# Patient Record
Sex: MALE | Race: OTHER | HISPANIC OR LATINO | URBAN - METROPOLITAN AREA
[De-identification: names, ages, dates, MRNs, and addresses within clinical notes are randomized per-mention and may not be internally consistent; named-entity substitution may affect disease eponyms.]

---

## 2018-12-08 VITALS
WEIGHT: 179.9 LBS | DIASTOLIC BLOOD PRESSURE: 111 MMHG | TEMPERATURE: 98 F | HEART RATE: 99 BPM | SYSTOLIC BLOOD PRESSURE: 148 MMHG | OXYGEN SATURATION: 95 % | RESPIRATION RATE: 18 BRPM

## 2018-12-08 NOTE — ED ADULT NURSE NOTE - CHIEF COMPLAINT QUOTE
pt presents with cough and chest pain. released from [facility] earlier today for treatment for pneumonia. patient has history of asthma and HIV.

## 2018-12-08 NOTE — ED ADULT TRIAGE NOTE - CHIEF COMPLAINT QUOTE
pt presents with cough and chest pain. released from [facility] earlier today for treatment for pneumonia. patient has history of asthma and HIV.

## 2018-12-08 NOTE — ED ADULT NURSE NOTE - NSIMPLEMENTINTERV_GEN_ALL_ED
Implemented All Universal Safety Interventions:  [place] to call system. Call bell, personal items and telephone within reach. Instruct patient to call for assistance. Room bathroom lighting operational. Non-slip footwear when patient is off stretcher. Physically safe environment: no spills, clutter or unnecessary equipment. Stretcher in lowest position, wheels locked, appropriate side rails in place.

## 2018-12-08 NOTE — ED ADULT NURSE NOTE - OBJECTIVE STATEMENT
41 yo M c.o chest tightness x 1 month. Pt states he was diagnosed with pnuemonia 3 months ago an hospitalized for 1 month for treatment. Pt states for 1 month he has tightness in chest and pain when coughing. Pt c.o white sputum with cough at this time. Pt aslo c.o increasing weakness. Pt noted to be afebrile in triage. Pt exhibiting no signs of acute distress and breathing without accessory muscle use at this time. Pt + pulses noted. Pt denies fever, chills, n,v,d, n/t to upper and lower extremities at this tiem.

## 2018-12-09 ENCOUNTER — INPATIENT (INPATIENT)
Facility: HOSPITAL | Age: 43
LOS: 4 days | Discharge: HOME | DRG: 969 | End: 2018-12-14
Attending: STUDENT IN AN ORGANIZED HEALTH CARE EDUCATION/TRAINING PROGRAM | Admitting: STUDENT IN AN ORGANIZED HEALTH CARE EDUCATION/TRAINING PROGRAM
Payer: MEDICAID

## 2018-12-09 DIAGNOSIS — Z29.9 ENCOUNTER FOR PROPHYLACTIC MEASURES, UNSPECIFIED: ICD-10-CM

## 2018-12-09 DIAGNOSIS — R63.8 OTHER SYMPTOMS AND SIGNS CONCERNING FOOD AND FLUID INTAKE: ICD-10-CM

## 2018-12-09 DIAGNOSIS — J18.9 PNEUMONIA, UNSPECIFIED ORGANISM: ICD-10-CM

## 2018-12-09 DIAGNOSIS — Z91.89 OTHER SPECIFIED PERSONAL RISK FACTORS, NOT ELSEWHERE CLASSIFIED: ICD-10-CM

## 2018-12-09 DIAGNOSIS — B20 HUMAN IMMUNODEFICIENCY VIRUS [HIV] DISEASE: ICD-10-CM

## 2018-12-09 DIAGNOSIS — J45.909 UNSPECIFIED ASTHMA, UNCOMPLICATED: ICD-10-CM

## 2018-12-09 DIAGNOSIS — I10 ESSENTIAL (PRIMARY) HYPERTENSION: ICD-10-CM

## 2018-12-09 LAB
ALBUMIN SERPL ELPH-MCNC: 3.8 G/DL — SIGNIFICANT CHANGE UP (ref 3.4–5)
ALBUMIN SERPL ELPH-MCNC: 4.2 G/DL — SIGNIFICANT CHANGE UP (ref 3.3–5)
ALP SERPL-CCNC: 67 U/L — SIGNIFICANT CHANGE UP (ref 40–120)
ALP SERPL-CCNC: 68 U/L — SIGNIFICANT CHANGE UP (ref 40–120)
ALT FLD-CCNC: 39 U/L — SIGNIFICANT CHANGE UP (ref 10–45)
ALT FLD-CCNC: 50 U/L — HIGH (ref 12–42)
ANION GAP SERPL CALC-SCNC: 15 MMOL/L — SIGNIFICANT CHANGE UP (ref 5–17)
ANION GAP SERPL CALC-SCNC: 8 MMOL/L — LOW (ref 9–16)
AST SERPL-CCNC: 32 U/L — SIGNIFICANT CHANGE UP (ref 15–37)
AST SERPL-CCNC: 33 U/L — SIGNIFICANT CHANGE UP (ref 10–40)
BASE EXCESS BLDA CALC-SCNC: -2.4 MMOL/L — LOW (ref -2–3)
BASOPHILS NFR BLD AUTO: 0.5 % — SIGNIFICANT CHANGE UP (ref 0–2)
BASOPHILS NFR BLD AUTO: 0.7 % — SIGNIFICANT CHANGE UP (ref 0–2)
BILIRUB SERPL-MCNC: 0.8 MG/DL — SIGNIFICANT CHANGE UP (ref 0.2–1.2)
BILIRUB SERPL-MCNC: 0.8 MG/DL — SIGNIFICANT CHANGE UP (ref 0.2–1.2)
BUN SERPL-MCNC: 13 MG/DL — SIGNIFICANT CHANGE UP (ref 7–23)
BUN SERPL-MCNC: 8 MG/DL — SIGNIFICANT CHANGE UP (ref 7–23)
CALCIUM SERPL-MCNC: 9.2 MG/DL — SIGNIFICANT CHANGE UP (ref 8.4–10.5)
CALCIUM SERPL-MCNC: 9.3 MG/DL — SIGNIFICANT CHANGE UP (ref 8.5–10.5)
CHLORIDE SERPL-SCNC: 95 MMOL/L — LOW (ref 96–108)
CHLORIDE SERPL-SCNC: 98 MMOL/L — SIGNIFICANT CHANGE UP (ref 96–108)
CHOLEST SERPL-MCNC: 141 MG/DL — SIGNIFICANT CHANGE UP (ref 10–199)
CO2 SERPL-SCNC: 21 MMOL/L — LOW (ref 22–31)
CO2 SERPL-SCNC: 27 MMOL/L — SIGNIFICANT CHANGE UP (ref 22–31)
CREAT SERPL-MCNC: 0.93 MG/DL — SIGNIFICANT CHANGE UP (ref 0.5–1.3)
CREAT SERPL-MCNC: 1.12 MG/DL — SIGNIFICANT CHANGE UP (ref 0.5–1.3)
EOSINOPHIL NFR BLD AUTO: 1.2 % — SIGNIFICANT CHANGE UP (ref 0–6)
EOSINOPHIL NFR BLD AUTO: 5.3 % — SIGNIFICANT CHANGE UP (ref 0–6)
FERRITIN SERPL-MCNC: 278 NG/ML — SIGNIFICANT CHANGE UP (ref 30–400)
GAS PNL BLDA: SIGNIFICANT CHANGE UP
GLUCOSE SERPL-MCNC: 105 MG/DL — HIGH (ref 70–99)
GLUCOSE SERPL-MCNC: 121 MG/DL — HIGH (ref 70–99)
HBA1C BLD-MCNC: 5.6 % — SIGNIFICANT CHANGE UP (ref 4–5.6)
HCO3 BLDA-SCNC: 22 MMOL/L — SIGNIFICANT CHANGE UP (ref 21–28)
HCT VFR BLD CALC: 35.3 % — LOW (ref 39–50)
HCT VFR BLD CALC: 36.1 % — LOW (ref 39–50)
HCV AB S/CO SERPL IA: 0.06 S/CO — SIGNIFICANT CHANGE UP
HCV AB SERPL-IMP: SIGNIFICANT CHANGE UP
HDLC SERPL-MCNC: 25 MG/DL — LOW
HGB BLD-MCNC: 11.9 G/DL — LOW (ref 13–17)
HGB BLD-MCNC: 12.3 G/DL — LOW (ref 13–17)
IMM GRANULOCYTES NFR BLD AUTO: 0.4 % — SIGNIFICANT CHANGE UP (ref 0–1.5)
IRON SATN MFR SERPL: 13 % — LOW (ref 16–55)
IRON SATN MFR SERPL: 34 UG/DL — LOW (ref 45–165)
LDH SERPL L TO P-CCNC: 259 U/L — HIGH (ref 50–242)
LIPID PNL WITH DIRECT LDL SERPL: 98 MG/DL — SIGNIFICANT CHANGE UP
LYMPHOCYTES # BLD AUTO: 18 % — SIGNIFICANT CHANGE UP (ref 13–44)
LYMPHOCYTES # BLD AUTO: 28.9 % — SIGNIFICANT CHANGE UP (ref 13–44)
MCHC RBC-ENTMCNC: 26.3 PG — LOW (ref 27–34)
MCHC RBC-ENTMCNC: 26.7 PG — LOW (ref 27–34)
MCHC RBC-ENTMCNC: 33.7 G/DL — SIGNIFICANT CHANGE UP (ref 32–36)
MCHC RBC-ENTMCNC: 34.1 G/DL — SIGNIFICANT CHANGE UP (ref 32–36)
MCV RBC AUTO: 77.3 FL — LOW (ref 80–100)
MCV RBC AUTO: 79.1 FL — LOW (ref 80–100)
MONOCYTES NFR BLD AUTO: 12.8 % — SIGNIFICANT CHANGE UP (ref 2–14)
MONOCYTES NFR BLD AUTO: 6.6 % — SIGNIFICANT CHANGE UP (ref 2–14)
NEUTROPHILS NFR BLD AUTO: 51.9 % — SIGNIFICANT CHANGE UP (ref 43–77)
NEUTROPHILS NFR BLD AUTO: 73.7 % — SIGNIFICANT CHANGE UP (ref 43–77)
PCO2 BLDA: 34 MMHG — LOW (ref 35–48)
PCO2 BLDA: 36 MMHG — SIGNIFICANT CHANGE UP (ref 35–48)
PCO2 BLDA: 40 MMHG — SIGNIFICANT CHANGE UP (ref 35–48)
PH BLDA: 7.41 — SIGNIFICANT CHANGE UP (ref 7.35–7.45)
PH BLDA: 7.41 — SIGNIFICANT CHANGE UP (ref 7.35–7.45)
PH BLDA: 7.43 — SIGNIFICANT CHANGE UP (ref 7.35–7.45)
PLATELET # BLD AUTO: 163 K/UL — SIGNIFICANT CHANGE UP (ref 150–400)
PLATELET # BLD AUTO: 172 K/UL — SIGNIFICANT CHANGE UP (ref 150–400)
PO2 BLDA: 41 MMHG — CRITICAL LOW (ref 83–108)
PO2 BLDA: 75 MMHG — LOW (ref 83–108)
PO2 BLDA: 86 MMHG — SIGNIFICANT CHANGE UP (ref 83–108)
POTASSIUM SERPL-MCNC: 4 MMOL/L — SIGNIFICANT CHANGE UP (ref 3.5–5.3)
POTASSIUM SERPL-MCNC: 4.2 MMOL/L — SIGNIFICANT CHANGE UP (ref 3.5–5.3)
POTASSIUM SERPL-SCNC: 4 MMOL/L — SIGNIFICANT CHANGE UP (ref 3.5–5.3)
POTASSIUM SERPL-SCNC: 4.2 MMOL/L — SIGNIFICANT CHANGE UP (ref 3.5–5.3)
PROT SERPL-MCNC: 8.2 G/DL — SIGNIFICANT CHANGE UP (ref 6.4–8.2)
PROT SERPL-MCNC: 8.5 G/DL — HIGH (ref 6–8.3)
RAPID RVP RESULT: SIGNIFICANT CHANGE UP
RBC # BLD: 4.46 M/UL — SIGNIFICANT CHANGE UP (ref 4.2–5.8)
RBC # BLD: 4.67 M/UL — SIGNIFICANT CHANGE UP (ref 4.2–5.8)
RBC # FLD: 13.7 % — SIGNIFICANT CHANGE UP (ref 10.3–14.5)
RBC # FLD: 13.8 % — SIGNIFICANT CHANGE UP (ref 10.3–16.9)
SAO2 % BLDA: 76 % — LOW (ref 95–100)
SAO2 % BLDA: 96 % — SIGNIFICANT CHANGE UP (ref 95–100)
SAO2 % BLDA: 97 % — SIGNIFICANT CHANGE UP (ref 95–100)
SODIUM SERPL-SCNC: 131 MMOL/L — LOW (ref 135–145)
SODIUM SERPL-SCNC: 133 MMOL/L — SIGNIFICANT CHANGE UP (ref 132–145)
TIBC SERPL-MCNC: 261 UG/DL — SIGNIFICANT CHANGE UP (ref 220–430)
TOTAL CHOLESTEROL/HDL RATIO MEASUREMENT: 5.6 RATIO — SIGNIFICANT CHANGE UP (ref 3.4–9.6)
TRIGL SERPL-MCNC: 90 MG/DL — SIGNIFICANT CHANGE UP (ref 10–149)
TROPONIN I SERPL-MCNC: <0.017 NG/ML — LOW (ref 0.02–0.06)
TSH SERPL-MCNC: 0.28 UIU/ML — LOW (ref 0.35–4.94)
UIBC SERPL-MCNC: 227 UG/DL — SIGNIFICANT CHANGE UP (ref 110–370)
WBC # BLD: 4.1 K/UL — SIGNIFICANT CHANGE UP (ref 3.8–10.5)
WBC # BLD: 4.5 K/UL — SIGNIFICANT CHANGE UP (ref 3.8–10.5)
WBC # FLD AUTO: 4.1 K/UL — SIGNIFICANT CHANGE UP (ref 3.8–10.5)
WBC # FLD AUTO: 4.5 K/UL — SIGNIFICANT CHANGE UP (ref 3.8–10.5)

## 2018-12-09 PROCEDURE — 93010 ELECTROCARDIOGRAM REPORT: CPT

## 2018-12-09 PROCEDURE — 99221 1ST HOSP IP/OBS SF/LOW 40: CPT

## 2018-12-09 PROCEDURE — 99285 EMERGENCY DEPT VISIT HI MDM: CPT | Mod: 25

## 2018-12-09 PROCEDURE — 71046 X-RAY EXAM CHEST 2 VIEWS: CPT | Mod: 26

## 2018-12-09 PROCEDURE — 99223 1ST HOSP IP/OBS HIGH 75: CPT | Mod: GC

## 2018-12-09 PROCEDURE — 71250 CT THORAX DX C-: CPT | Mod: 26

## 2018-12-09 PROCEDURE — 99053 MED SERV 10PM-8AM 24 HR FAC: CPT

## 2018-12-09 RX ORDER — DARUNAVIR ETHANOLATE AND COBICISTAT 800; 150 MG/1; MG/1
1 TABLET, FILM COATED ORAL DAILY
Qty: 0 | Refills: 0 | Status: DISCONTINUED | OUTPATIENT
Start: 2018-12-09 | End: 2018-12-10

## 2018-12-09 RX ORDER — DARUNAVIR 75 MG/1
600 TABLET, FILM COATED ORAL
Qty: 0 | Refills: 0 | Status: DISCONTINUED | OUTPATIENT
Start: 2018-12-09 | End: 2018-12-09

## 2018-12-09 RX ORDER — LISINOPRIL 2.5 MG/1
5 TABLET ORAL DAILY
Qty: 0 | Refills: 0 | Status: DISCONTINUED | OUTPATIENT
Start: 2018-12-09 | End: 2018-12-14

## 2018-12-09 RX ORDER — SODIUM CHLORIDE 9 MG/ML
1000 INJECTION INTRAMUSCULAR; INTRAVENOUS; SUBCUTANEOUS ONCE
Qty: 0 | Refills: 0 | Status: COMPLETED | OUTPATIENT
Start: 2018-12-09 | End: 2018-12-09

## 2018-12-09 RX ORDER — ACETAMINOPHEN 500 MG
650 TABLET ORAL EVERY 6 HOURS
Qty: 0 | Refills: 0 | Status: DISCONTINUED | OUTPATIENT
Start: 2018-12-09 | End: 2018-12-14

## 2018-12-09 RX ORDER — HEPARIN SODIUM 5000 [USP'U]/ML
5000 INJECTION INTRAVENOUS; SUBCUTANEOUS EVERY 8 HOURS
Qty: 0 | Refills: 0 | Status: DISCONTINUED | OUTPATIENT
Start: 2018-12-09 | End: 2018-12-10

## 2018-12-09 RX ORDER — DOLUTEGRAVIR SODIUM 25 MG/1
50 TABLET, FILM COATED ORAL DAILY
Qty: 0 | Refills: 0 | Status: DISCONTINUED | OUTPATIENT
Start: 2018-12-09 | End: 2018-12-10

## 2018-12-09 RX ORDER — AZITHROMYCIN 500 MG/1
500 TABLET, FILM COATED ORAL ONCE
Qty: 0 | Refills: 0 | Status: COMPLETED | OUTPATIENT
Start: 2018-12-09 | End: 2018-12-09

## 2018-12-09 RX ORDER — IPRATROPIUM/ALBUTEROL SULFATE 18-103MCG
3 AEROSOL WITH ADAPTER (GRAM) INHALATION EVERY 6 HOURS
Qty: 0 | Refills: 0 | Status: DISCONTINUED | OUTPATIENT
Start: 2018-12-09 | End: 2018-12-14

## 2018-12-09 RX ORDER — INFLUENZA VIRUS VACCINE 15; 15; 15; 15 UG/.5ML; UG/.5ML; UG/.5ML; UG/.5ML
0.5 SUSPENSION INTRAMUSCULAR ONCE
Qty: 0 | Refills: 0 | Status: COMPLETED | OUTPATIENT
Start: 2018-12-09 | End: 2018-12-10

## 2018-12-09 RX ORDER — CEFTRIAXONE 500 MG/1
1 INJECTION, POWDER, FOR SOLUTION INTRAMUSCULAR; INTRAVENOUS EVERY 24 HOURS
Qty: 0 | Refills: 0 | Status: DISCONTINUED | OUTPATIENT
Start: 2018-12-10 | End: 2018-12-14

## 2018-12-09 RX ORDER — CEFTRIAXONE 500 MG/1
1 INJECTION, POWDER, FOR SOLUTION INTRAMUSCULAR; INTRAVENOUS ONCE
Qty: 0 | Refills: 0 | Status: COMPLETED | OUTPATIENT
Start: 2018-12-09 | End: 2018-12-09

## 2018-12-09 RX ORDER — AZITHROMYCIN 500 MG/1
250 TABLET, FILM COATED ORAL DAILY
Qty: 0 | Refills: 0 | Status: DISCONTINUED | OUTPATIENT
Start: 2018-12-10 | End: 2018-12-10

## 2018-12-09 RX ADMIN — CEFTRIAXONE 100 GRAM(S): 500 INJECTION, POWDER, FOR SOLUTION INTRAMUSCULAR; INTRAVENOUS at 03:57

## 2018-12-09 RX ADMIN — Medication 531.25 MILLIGRAM(S): at 19:03

## 2018-12-09 RX ADMIN — DOLUTEGRAVIR SODIUM 50 MILLIGRAM(S): 25 TABLET, FILM COATED ORAL at 13:32

## 2018-12-09 RX ADMIN — Medication 40 MILLIGRAM(S): at 19:52

## 2018-12-09 RX ADMIN — DARUNAVIR ETHANOLATE AND COBICISTAT 1 TABLET(S): 800; 150 TABLET, FILM COATED ORAL at 13:32

## 2018-12-09 RX ADMIN — HEPARIN SODIUM 5000 UNIT(S): 5000 INJECTION INTRAVENOUS; SUBCUTANEOUS at 13:32

## 2018-12-09 RX ADMIN — AZITHROMYCIN 500 MILLIGRAM(S): 500 TABLET, FILM COATED ORAL at 03:55

## 2018-12-09 RX ADMIN — Medication 2 TABLET(S): at 06:11

## 2018-12-09 RX ADMIN — HEPARIN SODIUM 5000 UNIT(S): 5000 INJECTION INTRAVENOUS; SUBCUTANEOUS at 22:36

## 2018-12-09 RX ADMIN — SODIUM CHLORIDE 1000 MILLILITER(S): 9 INJECTION INTRAMUSCULAR; INTRAVENOUS; SUBCUTANEOUS at 01:18

## 2018-12-09 RX ADMIN — LISINOPRIL 5 MILLIGRAM(S): 2.5 TABLET ORAL at 11:06

## 2018-12-09 RX ADMIN — Medication 40 MILLIGRAM(S): at 07:30

## 2018-12-09 NOTE — H&P ADULT - ASSESSMENT
42M heterosexual with PMHx HIV (unknown VL or CD4, on tivicay, prezista, and cobisistat), HTN [facility] with c/o progressive SOB, cough, SHAVER x3-4 weeks likely 2/2 CAP with CT chest showing LLL consolidation.

## 2018-12-09 NOTE — CONSULT NOTE ADULT - PROBLEM SELECTOR RECOMMENDATION 9
Symptoms and radiology are consistent with pneumonia. The patient may have been treated for CAP in the past but has persistent symptoms since then. CT shows a predominance of the groundglass opacities and septal thickening in the left lower lobe which is atypical of PCP, but in the setting of questionable HIV control from possibly suboptimal HAART therapy, would preemptively treat for PCP given the degree of his illness. Would obtain another ABG to confirm presence of A-a gradient as we cannot calculate it from the prior ABG (no fio2 documented from [facility]ison time). Can also send B-D-glucan as well. Would also send sputum cultures given that his symptoms presumably have lingered since his last tx for CAP.     Recommend  - PCP tx with bactrim  - ABG to assess A-a gradient. Steroids if A-a grad>15  - Sputum cultures (bacterial and fungal) and B-D glucan  - Possible bronch tomorrow if collateral from prior admission and clinic cannot be obtained sooner.

## 2018-12-09 NOTE — H&P ADULT - PROBLEM SELECTOR PLAN 1
Pt presenting with cough productive yellow sputum from previously white, SOB, and SHAVER. CT chest showing LLL consolidation vs. atelectasis. Pt with h/o HIV (unknown VL or CD4). Repeat ABG showing pO2 of 75. Pt in no respiratory distress on exam with scattered wheezing/rhonchi. No fever, leukocytosis  -f/u RVP  -c/w CTX/azith Pt presenting with cough productive yellow sputum from previously white, SOB, and SHAVER. CT chest showing LLL consolidation vs. atelectasis. Pt with h/o HIV (unknown VL or CD4). Repeat ABG showing pO2 of 75. Pt in no respiratory distress on exam with scattered wheezing/rhonchi. No fever, leukocytosis.  -f/u RVP  -c/w CTX/azith  -duonebs PRN  -tylenol PRN fever and pain  -f/u blood cx  -sputum cx  -h/o HIV is concerning for possible PCP ppx though pt grossly nontoxic and CT chest showing lobar consolidation more suggestive of CAP; A-a gradient <35 and pO2 >70 less concerning for PCP  -will send LDH- f/u Pt presenting with cough productive yellow sputum from previously white, SOB, and SHAVER. CT chest showing LLL consolidation vs. atelectasis. Pt with h/o HIV (unknown VL or CD4). Repeat ABG showing pO2 of 75. Pt in no respiratory distress on exam with scattered wheezing/rhonchi. No fever, leukocytosis.  -f/u RVP  -c/w CTX/azith  -duonebs PRN  -tylenol PRN fever and pain  -f/u blood cx  -sputum cx  -h/o HIV is concerning for possible PCP though pt grossly nontoxic and CT chest showing lobar consolidation more suggestive of CAP; A-a gradient <35 and pO2 >70 less concerning for PCP  -will send LDH- f/u

## 2018-12-09 NOTE — CONSULT NOTE ADULT - ASSESSMENT
41 yo male with HIV (on Tivicay, Prezista, and Cobisistat with unknown VL or CD4) presenting with complaints of sob, productive cough and dyspnea. Currently admitted and treated for CAP

## 2018-12-09 NOTE — H&P ADULT - NSHPLABSRESULTS_GEN_ALL_CORE
.  LABS:                         11.9   4.5   )-----------( 163      ( 09 Dec 2018 01:21 )             35.3     12-09    133  |  98  |  13  ----------------------------<  105<H>  4.0   |  27  |  1.12    Ca    9.3      09 Dec 2018 01:21    TPro  8.2  /  Alb  3.8  /  TBili  0.8  /  DBili  x   /  AST  32  /  ALT  50<H>  /  AlkPhos  68  12-09        CARDIAC MARKERS ( 09 Dec 2018 01:21 )  <0.017 ng/mL / x     / x     / x     / x                RADIOLOGY, EKG & ADDITIONAL TESTS: Reviewed.

## 2018-12-09 NOTE — H&P ADULT - PROBLEM SELECTOR PLAN 2
On Tivicay 50mg qd, Prezista 600 mg BID, cobisistat 150 mg qd. Follows with [facility]. Unknown last VL or CD4. Pt reporting not on ppx medications. Has missed x3 weeks of medications 2/2 running out of Rx.  -c/w Tivicay   -c/w Prezista/cobisistat (therapeutic interchange)  -f/u VL, CD4  -f/u HCV  -f/u quantiferon On Tivicay 50mg qd, Prezista 600 mg BID, cobisistat 150 mg qd. Follows with [facility]. Unknown last VL or CD4. Pt reporting not on ppx medications. Has missed x3 weeks of medications 2/2 running out of Rx.  -c/w Tivicay   -c/w Prezista/cobisistat (therapeutic interchange)  -f/u VL, CD4  -f/u HCV  -f/u quantiferon  -HIV consult tomorrow

## 2018-12-09 NOTE — ED PROVIDER NOTE - X-RAY INTERPRETATION
Holley, I called this morning and spoke with patient's cardiology office ([place]) and she told me she spoke with [facility] and they were fine with stopping his Plavix the day before and the day of the procedure.   
Shirley,     I asked Dr. Villanueva about this patient's meds prior to EGD. This is his response:    Hold Plavix the day before and day of the procedure.     It would be beneficial to get cardiac clearance as well with his history. Thanks!  
ER physician/MARYCRUZ Andrade

## 2018-12-09 NOTE — ED PROVIDER NOTE - ENMT, MLM
Airway patent, Nasal mucosa clear. Mouth with normal mucosa. Pharyngeal erythema. Uvula midline. No trismus or drooling.

## 2018-12-09 NOTE — ED PROVIDER NOTE - OBJECTIVE STATEMENT
43 y/o M with PMH of Asthma and HIV (unknown CD4/VL) on HAART, presents to the ED with 2 months of dry-to-productive cough, stating his lungs hurt. Despite triage note, he denies being seen by any other healthcare providers for this complaint. He reports having associated chills, body aches and slight dizziness. He feels SOB when coughing. He has not taken any medication for his sx's prior to arrival.    Denies fever, headache, sore throat, dysphagia, drooling, palpitations, hemoptysis, abdo pain, N/V/D 43 y/o M with PMH of Asthma and HIV (unknown CD4/VL) on HAART, presents to the ED with 2 months of dry-to-productive cough, stating his lungs hurt. Despite triage note, he denies being seen by any other healthcare providers for this complaint. He reports having associated chills, body aches and slight dizziness. He feels SOB when coughing. He has not taken any medication for his sx's prior to arrival. He states his sx's have significantly worsened and now feels very weak so he came here for further evaluation.    Denies fever, headache, sore throat, dysphagia, drooling, palpitations, hemoptysis, abdo pain, N/V/D

## 2018-12-09 NOTE — CONSULT NOTE ADULT - SUBJECTIVE AND OBJECTIVE BOX
43 yo male with HIV (on Tivicay, Prezista, and Cobisistat with unknown VL or CD4) admitted to [facility] after presenting to [facility] with complaints of sob, productive cough and dyspnea. Symptoms have been ongoing for a couple of months and has been waxing and waning but he believes has been worsening overall. Cough is productive of white sputum but never with any blood in it. Episodes of coughs do not have an obvious trigger and are constant throughout the day. He also admits to episodes of diaphoresis and subjective fevers within the last couple of weeks. Other than constitutional and pulmonary symptoms, ROS is otherwise negative       VITAL SIGNS:  ICU Vital Signs Last 24 Hrs  T(C): 36.6 (09 Dec 2018 15:47), Max: 36.9 (09 Dec 2018 08:36)  T(F): 97.9 (09 Dec 2018 15:47), Max: 98.5 (09 Dec 2018 08:36)  HR: 78 (09 Dec 2018 15:47) (78 - 99)  BP: 128/83 (09 Dec 2018 15:47) (128/83 - 164/114)  BP(mean): --  ABP: --  ABP(mean): --  RR: 18 (09 Dec 2018 15:47) (16 - 18)  SpO2: 100% (09 Dec 2018 15:47) (95% - 100%)        12-09 @ 07:01  -  12-09 @ 16:33  --------------------------------------------------------  IN: 0 mL / OUT: 700 mL / NET: -700 mL      CAPILLARY BLOOD GLUCOSE    PHYSICAL EXAM:  Constitutional: in mild distress and ill appearing and diaphoretic  HEENT: NC/AT; PERRL, anicteric sclera; no oropharyngeal erythema or exudates; MMM  Neck: supple, no JVD  Respiratory: Rales heard on right mid and lower lung field with + egophony of the left lung  Cardiovascular: +S1/S2, RRR  Gastrointestinal: abdomen soft, NT/ND; +BS x4  Extremities: WWP; no clubbing, cyanosis or edema  Vascular: 2+ radial, DP/PT and femoral pulses B/L      MEDICATIONS:  MEDICATIONS  (STANDING):  darunavir 800 mG/cobicstat 150 mG 1 Tablet(s) Oral daily  dolutegravir 50 milliGRAM(s) Oral daily  heparin  Injectable 5000 Unit(s) SubCutaneous every 8 hours  influenza   Vaccine 0.5 milliLiter(s) IntraMuscular once  lisinopril 5 milliGRAM(s) Oral daily    MEDICATIONS  (PRN):  acetaminophen   Tablet .. 650 milliGRAM(s) Oral every 6 hours PRN Moderate Pain (4 - 6)  ALBUTerol/ipratropium for Nebulization 3 milliLiter(s) Nebulizer every 6 hours PRN Shortness of Breath      ALLERGIES:  Allergies    No Known Allergies    Intolerances        LABS:                        12.3   4.1   )-----------( 172      ( 09 Dec 2018 12:14 )             36.1     12-09    131<L>  |  95<L>  |  8   ----------------------------<  121<H>  4.2   |  21<L>  |  0.93    Ca    9.2      09 Dec 2018 12:14    TPro  8.5<H>  /  Alb  4.2  /  TBili  0.8  /  DBili  x   /  AST  33  /  ALT  39  /  AlkPhos  67  12-09          RADIOLOGY & ADDITIONAL TESTS:   CT reviewed - some groundglass opacities bilaterally but most pronounced in right lower lobe with some septal thickening 41 yo male with HIV (on Tivicay, Prezista, and Cobisistat with unknown VL or CD4) admitted to [facility] after presenting to [facility] with complaints of sob, productive cough and dyspnea. Symptoms have been ongoing for a couple of months and has been waxing and waning but he believes has been worsening overall. He was initially treated at [facility] and was hospitalized for 1-2 weeks and was discharged without any antibiotics afterwards and just with this HAART therapy. Symptoms were persistent afterwards but yesterday he started to have pleuritic chest pain with coughs, which prompted him to come to [facility] ED. He still has cough productive of white sputum. Episodes of coughs do not have an obvious trigger and are constant throughout the day. He also admits to episodes of diaphoresis and subjective fevers within the last couple of weeks. Other than constitutional and pulmonary symptoms, ROS is otherwise negative   His HIV is primarily managed by [facility] outpatient clinic. He claims that he does not take any other meds other than his HAART therapy, but he did not recognize Cobicistat (recognized tivicay, prezista, and lisinopril). He believes he has had recent bloodwork done for his HIV but does not recall his CD4 or VL, and does not remember speaking to his physician about it.   He recently traveled to [place] and to [place] 1 month ago. He denies any sick contacts and he also denies any smoking history.       VITAL SIGNS:  ICU Vital Signs Last 24 Hrs  T(C): 36.6 (09 Dec 2018 15:47), Max: 36.9 (09 Dec 2018 08:36)  T(F): 97.9 (09 Dec 2018 15:47), Max: 98.5 (09 Dec 2018 08:36)  HR: 78 (09 Dec 2018 15:47) (78 - 99)  BP: 128/83 (09 Dec 2018 15:47) (128/83 - 164/114)  BP(mean): --  ABP: --  ABP(mean): --  RR: 18 (09 Dec 2018 15:47) (16 - 18)  SpO2: 100% (09 Dec 2018 15:47) (95% - 100%)        12-09 @ 07:01  -  12-09 @ 16:33  --------------------------------------------------------  IN: 0 mL / OUT: 700 mL / NET: -700 mL      CAPILLARY BLOOD GLUCOSE    PHYSICAL EXAM:  Constitutional: in mild distress and ill appearing and diaphoretic  HEENT: NC/AT; PERRL, anicteric sclera; no oropharyngeal erythema or exudates; MMM  Neck: supple, no JVD  Respiratory: Rales heard on right mid and lower lung field with + egophony of the left lung  Cardiovascular: +S1/S2, RRR  Gastrointestinal: abdomen soft, NT/ND; +BS x4  Extremities: WWP; no clubbing, cyanosis or edema  Vascular: 2+ radial, DP/PT and femoral pulses B/L      MEDICATIONS:  MEDICATIONS  (STANDING):  darunavir 800 mG/cobicstat 150 mG 1 Tablet(s) Oral daily  dolutegravir 50 milliGRAM(s) Oral daily  heparin  Injectable 5000 Unit(s) SubCutaneous every 8 hours  influenza   Vaccine 0.5 milliLiter(s) IntraMuscular once  lisinopril 5 milliGRAM(s) Oral daily    MEDICATIONS  (PRN):  acetaminophen   Tablet .. 650 milliGRAM(s) Oral every 6 hours PRN Moderate Pain (4 - 6)  ALBUTerol/ipratropium for Nebulization 3 milliLiter(s) Nebulizer every 6 hours PRN Shortness of Breath      ALLERGIES:  Allergies    No Known Allergies    Intolerances        LABS:                        12.3   4.1   )-----------( 172      ( 09 Dec 2018 12:14 )             36.1     12-09    131<L>  |  95<L>  |  8   ----------------------------<  121<H>  4.2   |  21<L>  |  0.93    Ca    9.2      09 Dec 2018 12:14    TPro  8.5<H>  /  Alb  4.2  /  TBili  0.8  /  DBili  x   /  AST  33  /  ALT  39  /  AlkPhos  67  12-09          RADIOLOGY & ADDITIONAL TESTS:   CT reviewed - some groundglass opacities bilaterally but most pronounced in right lower lobe with some septal thickening

## 2018-12-09 NOTE — H&P ADULT - HISTORY OF PRESENT ILLNESS
42M heterosexual with PMHx HIV (unknown VL or CD4, on tivicay, prezista, and cobisistat), HTN presents from [facility] with c/o SOB, cough, SHAVER. Pt reports symptoms began 3-4 weeks ago. Initially with cough productive of white sputum and eventually evolved to yellow-colored phlegm. Denies keerthi blood in sputum. Reports symptoms associated with L sided chest wall tender whenever he coughs. Otherwise, denies fevers/chills, HA, increased frequency of cough, night sweats, weight loss. No nausea/vomiting, constipation. Pt reports one episode of watery, nonbloody diarrhea several days ago. Pt recently travelled to [place] and came back to [place] yesterday. Otherwise denies abdominal pain, BRBPR, sick contacts, chest discomfort, urinary symptoms.    Of note, pt reports compliance with his HAART medications, however has run out of his Rx and missed x3 weeks of doses. Reports diagnosed with HIV when he was 14 after having unprotected sexual intercourse with infected female partner at 12 years old. Pt follows with ID at [facility]. Does not recall his VL or CD4 and cannot recall his last visit. Currently not sexually active.

## 2018-12-10 DIAGNOSIS — B20 HUMAN IMMUNODEFICIENCY VIRUS [HIV] DISEASE: ICD-10-CM

## 2018-12-10 DIAGNOSIS — J45.909 UNSPECIFIED ASTHMA, UNCOMPLICATED: ICD-10-CM

## 2018-12-10 DIAGNOSIS — E87.1 HYPO-OSMOLALITY AND HYPONATREMIA: ICD-10-CM

## 2018-12-10 DIAGNOSIS — J18.1 LOBAR PNEUMONIA, UNSPECIFIED ORGANISM: ICD-10-CM

## 2018-12-10 DIAGNOSIS — I10 ESSENTIAL (PRIMARY) HYPERTENSION: ICD-10-CM

## 2018-12-10 LAB
4/8 RATIO: 0.08 RATIO — LOW (ref 0.9–3.6)
ABS CD8: 578 /UL — SIGNIFICANT CHANGE UP (ref 142–740)
ALBUMIN SERPL ELPH-MCNC: 4.5 G/DL — SIGNIFICANT CHANGE UP (ref 3.3–5)
ALP SERPL-CCNC: 73 U/L — SIGNIFICANT CHANGE UP (ref 40–120)
ALT FLD-CCNC: 38 U/L — SIGNIFICANT CHANGE UP (ref 10–45)
ANION GAP SERPL CALC-SCNC: 16 MMOL/L — SIGNIFICANT CHANGE UP (ref 5–17)
APTT BLD: 28.8 SEC — SIGNIFICANT CHANGE UP (ref 27.5–36.3)
AST SERPL-CCNC: 28 U/L — SIGNIFICANT CHANGE UP (ref 10–40)
BASOPHILS NFR BLD AUTO: 0.2 % — SIGNIFICANT CHANGE UP (ref 0–2)
BILIRUB SERPL-MCNC: 0.5 MG/DL — SIGNIFICANT CHANGE UP (ref 0.2–1.2)
BLD GP AB SCN SERPL QL: NEGATIVE — SIGNIFICANT CHANGE UP
BLD GP AB SCN SERPL QL: NEGATIVE — SIGNIFICANT CHANGE UP
BUN SERPL-MCNC: 10 MG/DL — SIGNIFICANT CHANGE UP (ref 7–23)
CALCIUM SERPL-MCNC: 9.3 MG/DL — SIGNIFICANT CHANGE UP (ref 8.4–10.5)
CD3 BLASTS SPEC-ACNC: 642 /UL — LOW (ref 672–1870)
CD3 BLASTS SPEC-ACNC: 85 % — HIGH (ref 59–83)
CD4 %: 6 % — LOW (ref 30–62)
CD8 %: 77 % — HIGH (ref 12–36)
CHLORIDE SERPL-SCNC: 91 MMOL/L — LOW (ref 96–108)
CO2 SERPL-SCNC: 21 MMOL/L — LOW (ref 22–31)
CREAT SERPL-MCNC: 1 MG/DL — SIGNIFICANT CHANGE UP (ref 0.5–1.3)
EOSINOPHIL NFR BLD AUTO: 0.2 % — SIGNIFICANT CHANGE UP (ref 0–6)
GLUCOSE SERPL-MCNC: 136 MG/DL — HIGH (ref 70–99)
HCT VFR BLD CALC: 40.4 % — SIGNIFICANT CHANGE UP (ref 39–50)
HGB BLD-MCNC: 13.7 G/DL — SIGNIFICANT CHANGE UP (ref 13–17)
HIV-1 VIRAL LOAD RESULT: ABNORMAL
HIV1 RNA # SERPL NAA+PROBE: SIGNIFICANT CHANGE UP
HIV1 RNA SER-IMP: SIGNIFICANT CHANGE UP
HIV1 RNA SERPL NAA+PROBE-ACNC: ABNORMAL
HIV1 RNA SERPL NAA+PROBE-LOG#: 4.53 — SIGNIFICANT CHANGE UP
INR BLD: 1.17 — HIGH (ref 0.88–1.16)
LYMPHOCYTES # BLD AUTO: 21.6 % — SIGNIFICANT CHANGE UP (ref 13–44)
MAGNESIUM SERPL-MCNC: 2.2 MG/DL — SIGNIFICANT CHANGE UP (ref 1.6–2.6)
MCHC RBC-ENTMCNC: 26 PG — LOW (ref 27–34)
MCHC RBC-ENTMCNC: 33.9 G/DL — SIGNIFICANT CHANGE UP (ref 32–36)
MCV RBC AUTO: 76.8 FL — LOW (ref 80–100)
MONOCYTES NFR BLD AUTO: 4.5 % — SIGNIFICANT CHANGE UP (ref 2–14)
NEUTROPHILS NFR BLD AUTO: 73.5 % — SIGNIFICANT CHANGE UP (ref 43–77)
PLATELET # BLD AUTO: 178 K/UL — SIGNIFICANT CHANGE UP (ref 150–400)
POTASSIUM SERPL-MCNC: 4.5 MMOL/L — SIGNIFICANT CHANGE UP (ref 3.5–5.3)
POTASSIUM SERPL-SCNC: 4.5 MMOL/L — SIGNIFICANT CHANGE UP (ref 3.5–5.3)
PROT SERPL-MCNC: 8.4 G/DL — HIGH (ref 6–8.3)
PROTHROM AB SERPL-ACNC: 13.3 SEC — HIGH (ref 10–12.9)
RBC # BLD: 5.26 M/UL — SIGNIFICANT CHANGE UP (ref 4.2–5.8)
RBC # FLD: 13.9 % — SIGNIFICANT CHANGE UP (ref 10.3–16.9)
RH IG SCN BLD-IMP: POSITIVE — SIGNIFICANT CHANGE UP
RH IG SCN BLD-IMP: POSITIVE — SIGNIFICANT CHANGE UP
SODIUM SERPL-SCNC: 128 MMOL/L — LOW (ref 135–145)
T-CELL CD4 SUBSET PNL BLD: 45 /UL — LOW (ref 489–1457)
WBC # BLD: 4.2 K/UL — SIGNIFICANT CHANGE UP (ref 3.8–10.5)
WBC # FLD AUTO: 4.2 K/UL — SIGNIFICANT CHANGE UP (ref 3.8–10.5)

## 2018-12-10 PROCEDURE — 99232 SBSQ HOSP IP/OBS MODERATE 35: CPT

## 2018-12-10 PROCEDURE — 71045 X-RAY EXAM CHEST 1 VIEW: CPT | Mod: 26

## 2018-12-10 PROCEDURE — 99233 SBSQ HOSP IP/OBS HIGH 50: CPT

## 2018-12-10 RX ORDER — AZITHROMYCIN 500 MG/1
1200 TABLET, FILM COATED ORAL
Qty: 0 | Refills: 0 | Status: DISCONTINUED | OUTPATIENT
Start: 2018-12-10 | End: 2018-12-10

## 2018-12-10 RX ORDER — FLUCONAZOLE 150 MG/1
200 TABLET ORAL EVERY 24 HOURS
Qty: 0 | Refills: 0 | Status: DISCONTINUED | OUTPATIENT
Start: 2018-12-11 | End: 2018-12-12

## 2018-12-10 RX ORDER — DARUNAVIR 75 MG/1
8 TABLET, FILM COATED ORAL
Qty: 0 | Refills: 0 | COMMUNITY

## 2018-12-10 RX ORDER — LISINOPRIL 2.5 MG/1
1 TABLET ORAL
Qty: 0 | Refills: 0 | COMMUNITY

## 2018-12-10 RX ORDER — FLUCONAZOLE 150 MG/1
400 TABLET ORAL ONCE
Qty: 0 | Refills: 0 | Status: COMPLETED | OUTPATIENT
Start: 2018-12-10 | End: 2018-12-10

## 2018-12-10 RX ORDER — FLUCONAZOLE 150 MG/1
400 TABLET ORAL ONCE
Qty: 0 | Refills: 0 | Status: DISCONTINUED | OUTPATIENT
Start: 2018-12-10 | End: 2018-12-10

## 2018-12-10 RX ORDER — FLUCONAZOLE 150 MG/1
800 TABLET ORAL ONCE
Qty: 0 | Refills: 0 | Status: DISCONTINUED | OUTPATIENT
Start: 2018-12-10 | End: 2018-12-10

## 2018-12-10 RX ORDER — HEPARIN SODIUM 5000 [USP'U]/ML
5000 INJECTION INTRAVENOUS; SUBCUTANEOUS EVERY 8 HOURS
Qty: 0 | Refills: 0 | Status: DISCONTINUED | OUTPATIENT
Start: 2018-12-10 | End: 2018-12-10

## 2018-12-10 RX ORDER — PANTOPRAZOLE SODIUM 20 MG/1
40 TABLET, DELAYED RELEASE ORAL
Qty: 0 | Refills: 0 | Status: DISCONTINUED | OUTPATIENT
Start: 2018-12-10 | End: 2018-12-12

## 2018-12-10 RX ORDER — AZITHROMYCIN 500 MG/1
250 TABLET, FILM COATED ORAL DAILY
Qty: 0 | Refills: 0 | Status: DISCONTINUED | OUTPATIENT
Start: 2018-12-10 | End: 2018-12-12

## 2018-12-10 RX ORDER — DARUNAVIR 75 MG/1
6 TABLET, FILM COATED ORAL
Qty: 0 | Refills: 0 | COMMUNITY

## 2018-12-10 RX ORDER — FLUCONAZOLE 150 MG/1
400 TABLET ORAL EVERY 24 HOURS
Qty: 0 | Refills: 0 | Status: DISCONTINUED | OUTPATIENT
Start: 2018-12-10 | End: 2018-12-10

## 2018-12-10 RX ORDER — HEPARIN SODIUM 5000 [USP'U]/ML
5000 INJECTION INTRAVENOUS; SUBCUTANEOUS ONCE
Qty: 0 | Refills: 0 | Status: COMPLETED | OUTPATIENT
Start: 2018-12-10 | End: 2018-12-10

## 2018-12-10 RX ADMIN — CEFTRIAXONE 100 GRAM(S): 500 INJECTION, POWDER, FOR SOLUTION INTRAMUSCULAR; INTRAVENOUS at 02:15

## 2018-12-10 RX ADMIN — AZITHROMYCIN 250 MILLIGRAM(S): 500 TABLET, FILM COATED ORAL at 12:46

## 2018-12-10 RX ADMIN — FLUCONAZOLE 400 MILLIGRAM(S): 150 TABLET ORAL at 17:06

## 2018-12-10 RX ADMIN — HEPARIN SODIUM 5000 UNIT(S): 5000 INJECTION INTRAVENOUS; SUBCUTANEOUS at 06:32

## 2018-12-10 RX ADMIN — Medication 354.17 MILLIGRAM(S): at 15:22

## 2018-12-10 RX ADMIN — Medication 40 MILLIGRAM(S): at 17:06

## 2018-12-10 RX ADMIN — Medication 354.17 MILLIGRAM(S): at 22:46

## 2018-12-10 RX ADMIN — DOLUTEGRAVIR SODIUM 50 MILLIGRAM(S): 25 TABLET, FILM COATED ORAL at 12:47

## 2018-12-10 RX ADMIN — Medication 40 MILLIGRAM(S): at 06:31

## 2018-12-10 RX ADMIN — INFLUENZA VIRUS VACCINE 0.5 MILLILITER(S): 15; 15; 15; 15 SUSPENSION INTRAMUSCULAR at 15:25

## 2018-12-10 RX ADMIN — LISINOPRIL 5 MILLIGRAM(S): 2.5 TABLET ORAL at 06:31

## 2018-12-10 RX ADMIN — HEPARIN SODIUM 5000 UNIT(S): 5000 INJECTION INTRAVENOUS; SUBCUTANEOUS at 22:46

## 2018-12-10 RX ADMIN — Medication 354.17 MILLIGRAM(S): at 06:32

## 2018-12-10 RX ADMIN — DARUNAVIR ETHANOLATE AND COBICISTAT 1 TABLET(S): 800; 150 TABLET, FILM COATED ORAL at 12:46

## 2018-12-10 RX ADMIN — HEPARIN SODIUM 5000 UNIT(S): 5000 INJECTION INTRAVENOUS; SUBCUTANEOUS at 15:23

## 2018-12-10 NOTE — CONSULT NOTE ADULT - SUBJECTIVE AND OBJECTIVE BOX
HPI:  42M Azeri-speaking w/ HIV (unknown VL or CD4, on tivicay, prezista, and cobicistat and PMH HTN presenting from [facility] with c/o SOB, cough, SHAVRE. History obtained with  #[id number]. Pt reports sx have been present since July after pt went to [place]. Patient has been in [place] from early July until two days ago as he was visiting family. Pt reports sx began shortly after arriving in [facility]. Pt visited [place] from the Sept to Oct, and believes sx worsened during that time. Additional sx include night sweats, greenish sputum, malaise, CP during coughing. Denies f/c/n/v/d/c, HA, increased frequency of cough, weight loss, ab pain, palpitations, dysuria, BRBPR, melena, hematuria. Pt reports that he has not been taking his ARV since arriving in [facility] as his meds were spilled during his flight from [place] to [facility]. Reports last know VL was UD in June 2018. Regarding HIV, pt was diagnosed in early 90's. Contracted dz at age 14 after having unprotected sexual intercourse with infected female partner at 12 years old. Pt follows with ID (Poncho Yu) at [facility]. Denies sexual contact since diagnosis. No hx hepatitis, other STI.     PAST MEDICAL & SURGICAL HISTORY:  Asthma  HIV (human immunodeficiency virus infection)  No significant past surgical history    Allergies    No Known Allergies    Intolerances    FAMILY HISTORY:  DM (mother)    SOCIAL HISTORY: never smoker, no EtOH use, no IVDU    VITAL SIGNS:  T(C): 36.2 (12-10-18 @ 08:56), Max: 37.2 (12-09-18 @ 20:55)  T(F): 97.2 (12-10-18 @ 08:56), Max: 98.9 (12-09-18 @ 20:55)  HR: 87 (12-10-18 @ 08:56) (80 - 87)  BP: 148/94 (12-10-18 @ 08:56) (129/93 - 148/94)  BP(mean): --  RR: 18 (12-10-18 @ 08:56) (18 - 18)  SpO2: 95% (12-10-18 @ 08:56) (95% - 100%)  Wt(kg): --    PHYSICAL EXAM:    Constitutional: WDWN resting comfortably in bed; NAD  Head: NC/AT  Eyes: PERRL, EOMI, anicteric sclera  ENT: no nasal discharge; uvula midline, whitish plaque along the uvula; no oropharyngeal erythema; MMM  Neck: supple; no JVD or thyromegaly  Respiratory: mild rhonchi b/l  Cardiac: +S1/S2; RRR; no M/R/G  Gastrointestinal: soft, NT/ND; no rebound or guarding; +BSx4  Back: spine midline, no bony tenderness or step-offs; no CVAT B/L  Extremities: WWP, no clubbing or cyanosis; no peripheral edema  Musculoskeletal: NROM x4; no joint swelling, tenderness or erythema  Vascular: 2+ radial, DP pulses B/L  Dermatologic: excoriations along the back; skin otherwise warm, dry and intact; no rashes, wounds, or scars  Lymphatic: nontender submandibular LAD  Neurologic: AAOx3; CNII-XII grossly intact; no focal deficits    LABS:                        13.7   4.2   )-----------( 178      ( 10 Dec 2018 05:53 )             40.4     12-10    128<L>  |  91<L>  |  10  ----------------------------<  136<H>  4.5   |  21<L>  |  1.00    Ca    9.3      10 Dec 2018 05:53  Mg     2.2     12-10    TPro  8.4<H>  /  Alb  4.5  /  TBili  0.5  /  DBili  x   /  AST  28  /  ALT  38  /  AlkPhos  73  12-10    PT/INR - ( 10 Dec 2018 05:53 )   PT: 13.3 sec;   INR: 1.17       PTT - ( 10 Dec 2018 05:53 )  PTT:28.8 sec    T Cell Subset (12.10.18 @ 05:01)    CD3 %: 85    CD4 %: 6 %    CD8 %: 77 %    ABS CD3: 642 /uL    ABS CD4: 45 /uL    ABS CD8: 578 /uL    Hemoglobin A1C, Whole Blood (12.09.18 @ 12:26)    Hemoglobin A1C, Whole Blood: 5.6    Lipid Profile (12.09.18 @ 12:14)    Total Cholesterol/HDL Ratio Measurement: 5.6 RATIO    Cholesterol, Serum: 141 mg/dL    Triglycerides, Serum: 90 mg/dL    HDL Cholesterol, Serum: 25    Thyroid Stimulating Hormone, Serum (12.09.18 @ 12:14)    Thyroid Stimulating Hormone, Serum: 0.275 uIU/mL    Lactate Dehydrogenase, Serum (12.09.18 @ 12:14)    Lactate Dehydrogenase, Serum: 259 U/L    Hepatitis C Antibody Test (12.09.18 @ 12:14)    Hepatitis C Virus S/CO Ratio: 0.06 S/CO    Hepatitis C Virus Interpretation: Nonreact    Blood Gas Profile - Arterial (12.09.18 @ 19:05)    pH, Arterial: 7.41    pCO2, Arterial: 34 mmHg    pO2, Arterial: 86 mmHg    HCO3, Arterial: 22 mmol/L    Base Excess, Arterial: -2.4 mmol/L    Oxygen Saturation, Arterial: 97 %    Blood Gas Source Arterial: Arterial    RADIOLOGY & ADDITIONAL TESTS:    EXAM:  CT CHEST                           PROCEDURE DATE:  12/09/2018      IMPRESSION:   Patchy groundglass atelectasis/consolidation at the left lower lobe. Findings may represent an evolving infiltrate. Cortically located with the patient's presentation.    Diffuse hepatic steatosis.    Numerous scattered subcentimeter mediastinal and axillary, right greater than left lymph nodes of unknown etiology.

## 2018-12-10 NOTE — PROGRESS NOTE ADULT - PROBLEM SELECTOR PLAN 1
Again, his symptoms and radiology are consistent with pneumonia. Collateral information further obtained from [facility] indicated that he was tx for CAP with ceftriaxone and azithromycin. His CD4 count at the time was 87 and he was supposed to take HAART as well as bactrim ppx. Per him he only took two of the medications and likely is susceptible to all OI's. Again CT shows a predominance of the groundglass opacities and septal thickening in the left lower lobe which is atypical of PCP. Given his travel history, other fungal etiologies such as Cocciodio and Histo are also possible and would further evaluate for it. CT done at [facility] 6 months ago reportedly did not have any significant infiltrates (formal result to follow later via fax). With chronic symptoms and possibly fleeting or waxing/ waning infiltrates, non-infectious etiologies such as  is also possible, however would need to r/o infection first.     Recommend  - PCP tx with bactrim and steroids  - send serologies for fungal infections (coccidio, histo, blastomycosis)  - Sputum cultures (bacterial and fungal) and B-D glucan  - Scheduled for bronch with BAL today - please keep NPO    Will continue to follow

## 2018-12-10 NOTE — PROGRESS NOTE ADULT - PROBLEM SELECTOR PLAN 4
-jenny SALAZARN Na 131 on admission, 128 today. Unclear etiology. Could be SIADH, idiopathic vs 2/2 PCP vs fluid overload though pt not getting fluid and is tolerating PO  - f/u urine lytes  - trend BMP Na 131 on admission, 128 today. Unclear etiology. Could be SIADH, idiopathic vs 2/2 PCP vs fluid overload though pt not getting fluid and is tolerating PO. Could be 2/2 legionella  - f/u urine lytes  - f/u urine legionella   - trend BMP

## 2018-12-10 NOTE — PROGRESS NOTE ADULT - SUBJECTIVE AND OBJECTIVE BOX
Patient is a 42y old  Male who presents with a chief complaint of CAP (10 Dec 2018 16:08)      INTERVAL HPI/OVERNIGHT EVENTS:    Pt. seen and examined at 11AM  Pt. c/o cough, pleuritic CP, and SHAVER, unchanged  No F/C or hemoptysis    Review of Systems: 12 point review of systems otherwise negative    MEDICATIONS  (STANDING):  azithromycin   Tablet 250 milliGRAM(s) Oral daily  cefTRIAXone   IVPB 1 Gram(s) IV Intermittent every 24 hours  heparin  Injectable 5000 Unit(s) SubCutaneous every 8 hours  lisinopril 5 milliGRAM(s) Oral daily  pantoprazole    Tablet 40 milliGRAM(s) Oral before breakfast  predniSONE   Tablet 40 milliGRAM(s) Oral every 12 hours  trimethoprim / sulfamethoxazole IVPB 500 milliGRAM(s) IV Intermittent every 8 hours    MEDICATIONS  (PRN):  acetaminophen   Tablet .. 650 milliGRAM(s) Oral every 6 hours PRN Moderate Pain (4 - 6)  acetaminophen   Tablet .. 650 milliGRAM(s) Oral every 6 hours PRN Temp greater or equal to 38C (100.4F)  ALBUTerol/ipratropium for Nebulization 3 milliLiter(s) Nebulizer every 6 hours PRN Shortness of Breath      Allergies    No Known Allergies    Intolerances          Vital Signs Last 24 Hrs  T(C): 36.8 (10 Dec 2018 16:45), Max: 37.2 (09 Dec 2018 20:55)  T(F): 98.3 (10 Dec 2018 16:45), Max: 98.9 (09 Dec 2018 20:55)  HR: 91 (10 Dec 2018 16:45) (80 - 91)  BP: 131/82 (10 Dec 2018 16:45) (129/93 - 148/94)  BP(mean): --  RR: 18 (10 Dec 2018 16:45) (18 - 18)  SpO2: 93% (10 Dec 2018 16:45) (93% - 100%)  CAPILLARY BLOOD GLUCOSE          12-09 @ 07:01  -  12-10 @ 07:00  --------------------------------------------------------  IN: 0 mL / OUT: 700 mL / NET: -700 mL        Physical Exam:  (at 11AM)  Daily     Daily   General:  non-toxic and comfortable-appearing in NAD; speaking in full sentences  HEENT:  MMM  CV:  RRR, no JVD  Lungs:  B/L crackles; normal WOB on NC  Abdomen:  soft NT ND  Extremities:  no edema B/L LE  Skin:  WWP  Neuro:  AAOx3    LABS:                        13.7   4.2   )-----------( 178      ( 10 Dec 2018 05:53 )             40.4     12-10    128<L>  |  91<L>  |  10  ----------------------------<  136<H>  4.5   |  21<L>  |  1.00    Ca    9.3      10 Dec 2018 05:53  Mg     2.2     12-10    TPro  8.4<H>  /  Alb  4.5  /  TBili  0.5  /  DBili  x   /  AST  28  /  ALT  38  /  AlkPhos  73  12-10    PT/INR - ( 10 Dec 2018 05:53 )   PT: 13.3 sec;   INR: 1.17          PTT - ( 10 Dec 2018 05:53 )  PTT:28.8 sec        RADIOLOGY & ADDITIONAL TESTS:    ---------------------------------------------------------------------------  I personally reviewed: [  ]EKG   [  ]CXR    [  ] CT    [  ]Other  ---------------------------------------------------------------------------  PLEASE CHECK WHEN PRESENT:     [  ]Heart Failure     [  ] Acute     [  ] Acute on Chronic     [  ] Chronic  -------------------------------------------------------------------     [  ]Diastolic [HFpEF]     [  ]Systolic [HFrEF]     [  ]Combined [HFpEF & HFrEF]     [  ]Other:  -------------------------------------------------------------------  [  ]SABRINA     [  ]ATN     [  ]Reneal Medullary Necrosis     [  ]Renal Cortical Necrosis     [  ]Other Pathological Lesions:    [  ]CKD 1  [  ]CKD 2  [  ]CKD 3  [  ]CKD 4  [  ]CKD 5  [  ]Other  -------------------------------------------------------------------  [  ]Other/Unspecified:    --------------------------------------------------------------------    Abdominal Nutritional Status  [  ]Malnutrition: See Nutrition Note  [  ]Cachexia  [  ]Other:   [  ]Supplement Ordered:  [  ]Morbid Obesity (BMI >=40]

## 2018-12-10 NOTE — PROGRESS NOTE ADULT - SUBJECTIVE AND OBJECTIVE BOX
INTERVAL HISTORY:  Patient seen and examined at bedside. He feels slightly better but still with difficulty breathing and some abdominal pain, which he attributes to the PO medications he had just taken prior to the interview.    VITAL SIGNS:  ICU Vital Signs Last 24 Hrs  T(C): 36.2 (10 Dec 2018 08:56), Max: 37.2 (09 Dec 2018 20:55)  T(F): 97.2 (10 Dec 2018 08:56), Max: 98.9 (09 Dec 2018 20:55)  HR: 87 (10 Dec 2018 08:56) (78 - 87)  BP: 148/94 (10 Dec 2018 08:56) (128/83 - 164/114)  BP(mean): --  ABP: --  ABP(mean): --  RR: 18 (10 Dec 2018 08:56) (18 - 18)  SpO2: 95% (10 Dec 2018 08:56) (95% - 100%)        12-09 @ 07:01  -  12-10 @ 07:00  --------------------------------------------------------  IN: 0 mL / OUT: 700 mL / NET: -700 mL      CAPILLARY BLOOD GLUCOSE          PHYSICAL EXAM:  Constitutional: still in mild distress  HEENT: NC/AT; PERRL, anicteric sclera; no oropharyngeal erythema or exudates; dry oral mucosa  Neck: supple, no JVD  Respiratory: still with persistent rales in lower lung fields bilaterally, seems to be in slight respiratory distress but is able to speak full sentences  Cardiovascular: +S1/S2, RRR  Gastrointestinal: abdomen soft, NT/ND; +BS x4  Extremities: WWP; no clubbing, cyanosis or edema  Vascular: 2+ radial, DP/PT and femoral pulses B/L      MEDICATIONS:  MEDICATIONS  (STANDING):  azithromycin   Tablet 250 milliGRAM(s) Oral daily  cefTRIAXone   IVPB 1 Gram(s) IV Intermittent every 24 hours  darunavir 800 mG/cobicstat 150 mG 1 Tablet(s) Oral daily  dolutegravir 50 milliGRAM(s) Oral daily  influenza   Vaccine 0.5 milliLiter(s) IntraMuscular once  lisinopril 5 milliGRAM(s) Oral daily  predniSONE   Tablet 40 milliGRAM(s) Oral every 12 hours  trimethoprim / sulfamethoxazole IVPB 500 milliGRAM(s) IV Intermittent every 8 hours    MEDICATIONS  (PRN):  acetaminophen   Tablet .. 650 milliGRAM(s) Oral every 6 hours PRN Temp greater or equal to 38C (100.4F)  acetaminophen   Tablet .. 650 milliGRAM(s) Oral every 6 hours PRN Moderate Pain (4 - 6)  ALBUTerol/ipratropium for Nebulization 3 milliLiter(s) Nebulizer every 6 hours PRN Shortness of Breath      ALLERGIES:  Allergies    No Known Allergies    Intolerances        LABS:                        13.7   4.2   )-----------( 178      ( 10 Dec 2018 05:53 )             40.4     12-10    128<L>  |  91<L>  |  10  ----------------------------<  136<H>  4.5   |  21<L>  |  1.00    Ca    9.3      10 Dec 2018 05:53  Mg     2.2     12-10    TPro  8.4<H>  /  Alb  4.5  /  TBili  0.5  /  DBili  x   /  AST  28  /  ALT  38  /  AlkPhos  73  12-10    PT/INR - ( 10 Dec 2018 05:53 )   PT: 13.3 sec;   INR: 1.17          PTT - ( 10 Dec 2018 05:53 )  PTT:28.8 sec      RADIOLOGY & ADDITIONAL TESTS:   CXR reviewed - linear atelectasis, no other acute infiltrates

## 2018-12-10 NOTE — PROGRESS NOTE ADULT - PROBLEM SELECTOR PLAN 1
Pt presenting with cough productive yellow sputum from previously white, SOB, and SHAVER. CT chest showing LLL consolidation vs. atelectasis. Pt with h/o HIV (unknown VL or CD4). Repeat ABG showing pO2 of 75. Pt in no respiratory distress on exam with scattered wheezing/rhonchi. No fever, leukocytosis.  -f/u RVP  -c/w CTX/azith  -duonebs PRN  -tylenol PRN fever and pain  -f/u blood cx  -sputum cx  -h/o HIV is concerning for possible PCP though pt grossly nontoxic and CT chest showing lobar consolidation more suggestive of CAP; A-a gradient <35 and pO2 >70 less concerning for PCP  -will send LDH- f/u Pt presenting with cough productive yellow sputum from previously white, SOB, and SHAVER. CT chest showing LLL consolidation vs. atelectasis. Pt with h/o HIV (unknown VL or CD4). Repeat ABG showing pO2 of 75. Pt in no respiratory distress on exam with scattered wheezing/rhonchi. No fever, leukocytosis.  -f/u RVP  -c/w CTX/azith  -duonebs PRN  -tylenol PRN fever and pain  -f/u blood cx  -sputum cx  -h/o HIV is concerning for possible PCP though pt grossly nontoxic and CT chest showing lobar consolidation more suggestive of CAP; A-a gradient <35 and pO2 >70 less concerning for PCP  -f/u LDH Pt presenting with cough productive yellow sputum from previously white, SOB, and SHAVER. CT chest showing LLL consolidation vs. atelectasis. Pt with h/o HIV (unknown VL or CD4). Repeat ABG showing pO2 of 75. Pt in no respiratory distress on exam with scattered wheezing/rhonchi. No fever, leukocytosis.  -f/u RVP  -c/w CTX/azith  -duonebs PRN  -tylenol PRN fever and pain  -f/u blood cx  -sputum cx  -h/o HIV is concerning for possible PCP though pt grossly nontoxic and CT chest showing lobar consolidation more suggestive of CAP; A-a gradient <35 and pO2 >70 less concerning for PCP  -LDH mildly elevated

## 2018-12-10 NOTE — PROGRESS NOTE ADULT - PROBLEM SELECTOR PLAN 7
1) PCP Contacted on Admission: (Y/N) --> Name & Phone #:  2) Date of Contact with PCP:  3) PCP Contacted at Discharge: (Y/N, N/A)  4) Summary of Handoff Given to PCP:   5) Post-Discharge Appointment Date and Location: ppx: [facility]

## 2018-12-10 NOTE — PROGRESS NOTE ADULT - SUBJECTIVE AND OBJECTIVE BOX
OVERNIGHT EVENTS:    SUBJECTIVE:    Vital Signs Last 12 Hrs  T(F): 98.1 (12-10-18 @ 04:07), Max: 98.9 (12-09-18 @ 20:55)  HR: 80 (12-10-18 @ 04:07) (80 - 80)  BP: 133/91 (12-10-18 @ 04:07) (129/93 - 133/91)  BP(mean): --  RR: 18 (12-10-18 @ 04:07) (18 - 18)  SpO2: 95% (12-10-18 @ 04:07) (95% - 100%)  I&O's Summary    09 Dec 2018 07:01  -  10 Dec 2018 07:00  --------------------------------------------------------  IN: 0 mL / OUT: 700 mL / NET: -700 mL        PHYSICAL EXAM:  Constitutional: NAD, comfortable in bed.  HEENT: NC/AT, PERRLA, EOMI, no conjunctival pallor or scleral icterus, MMM  Neck: Supple, no JVD  Respiratory: Normal rate, rhythm, depth, effort. CTAB. No w/r/r.   Cardiovascular: RRR, normal S1 and S2, no m/r/g.   Gastrointestinal: +BS, soft NTND, no guarding or rebound tenderness, no palpable masses   Extremities: wwp; no cyanosis, clubbing or edema.   Vascular: Pulses equal and strong throughout.   Neurological: AAOx3, no CN deficits, strength and sensation intact throughout.   Skin: No gross skin abnormalities or rashes        LABS:                        13.7   4.2   )-----------( 178      ( 10 Dec 2018 05:53 )             40.4     12-10    128<L>  |  91<L>  |  10  ----------------------------<  136<H>  4.5   |  21<L>  |  1.00    Ca    9.3      10 Dec 2018 05:53  Mg     2.2     12-10    TPro  8.4<H>  /  Alb  4.5  /  TBili  0.5  /  DBili  x   /  AST  28  /  ALT  38  /  AlkPhos  73  12-10    PT/INR - ( 10 Dec 2018 05:53 )   PT: 13.3 sec;   INR: 1.17          PTT - ( 10 Dec 2018 05:53 )  PTT:28.8 sec      RADIOLOGY & ADDITIONAL TESTS:    MEDICATIONS  (STANDING):  azithromycin   Tablet 250 milliGRAM(s) Oral daily  cefTRIAXone   IVPB 1 Gram(s) IV Intermittent every 24 hours  darunavir 800 mG/cobicstat 150 mG 1 Tablet(s) Oral daily  dolutegravir 50 milliGRAM(s) Oral daily  heparin  Injectable 5000 Unit(s) SubCutaneous every 8 hours  influenza   Vaccine 0.5 milliLiter(s) IntraMuscular once  lisinopril 5 milliGRAM(s) Oral daily  predniSONE   Tablet 40 milliGRAM(s) Oral every 12 hours  trimethoprim / sulfamethoxazole IVPB 500 milliGRAM(s) IV Intermittent every 8 hours    MEDICATIONS  (PRN):  acetaminophen   Tablet .. 650 milliGRAM(s) Oral every 6 hours PRN Temp greater or equal to 38C (100.4F)  acetaminophen   Tablet .. 650 milliGRAM(s) Oral every 6 hours PRN Moderate Pain (4 - 6)  ALBUTerol/ipratropium for Nebulization 3 milliLiter(s) Nebulizer every 6 hours PRN Shortness of Breath OVERNIGHT EVENTS: LB    SUBJECTIVE: Pt seen and examined at bedside. Endorsing dry cough, left rib pain, central abdominal pain (5/10) and hunger. I explained to him that he may be undergoing bronchoscopy today and is NPO for that reason. Otherwise denies fever, shortness of breath, n/v/d.     Vital Signs Last 12 Hrs  T(F): 98.1 (12-10-18 @ 04:07), Max: 98.9 (12-09-18 @ 20:55)  HR: 80 (12-10-18 @ 04:07) (80 - 80)  BP: 133/91 (12-10-18 @ 04:07) (129/93 - 133/91)  BP(mean): --  RR: 18 (12-10-18 @ 04:07) (18 - 18)  SpO2: 95% (12-10-18 @ 04:07) (95% - 100%)  I&O's Summary    09 Dec 2018 07:01  -  10 Dec 2018 07:00  --------------------------------------------------------  IN: 0 mL / OUT: 700 mL / NET: -700 mL    PHYSICAL EXAM:  Constitutional: WDWN resting comfortably in bed; NAD, no respiratory distress  Head: NC/AT  Eyes: PERRL, EOMI, anicteric sclera  ENT: no nasal discharge; uvula midline, no oropharyngeal erythema or exudates; somewhat dry  Neck: supple; no JVD or thyromegaly  Respiratory: scattered wheezing/rhonchi b/l, no conversational dyspnea  Cardiac: +S1/S2; RRR; no M/R/G  Gastrointestinal: soft, obese abdomen, NT/ND; no rebound or guarding; +BSx4  Back: spine midline, no bony tenderness or step-offs; no CVAT B/L  Extremities: WWP, no clubbing or cyanosis; no peripheral edema  Musculoskeletal: NROM x4; no joint swelling, tenderness or erythema  Vascular: 2+ radial, femoral, DP/PT pulses B/L  Dermatologic: skin warm, dry and intact; no rashes, wounds, or scars  Lymphatic: no submandibular or cervical LAD  Neurologic: AAOx3; CNII-XII grossly intact; no focal deficits  Psychiatric: affect and characteristics of appearance, verbalizations, behaviors are appropriate    LABS:                        13.7   4.2   )-----------( 178      ( 10 Dec 2018 05:53 )             40.4     12-10    128<L>  |  91<L>  |  10  ----------------------------<  136<H>  4.5   |  21<L>  |  1.00    Ca    9.3      10 Dec 2018 05:53  Mg     2.2     12-10    TPro  8.4<H>  /  Alb  4.5  /  TBili  0.5  /  DBili  x   /  AST  28  /  ALT  38  /  AlkPhos  73  12-10    PT/INR - ( 10 Dec 2018 05:53 )   PT: 13.3 sec;   INR: 1.17          PTT - ( 10 Dec 2018 05:53 )  PTT:28.8 sec    RADIOLOGY & ADDITIONAL TESTS:    < from: CT Chest No Cont (12.09.18 @ 02:47) >    IMPRESSION:   Patchy groundglass atelectasis/consolidation at the left lower lobe.   Findings may represent an evolving infiltrate. Cortically located with   the patient's presentation.    Diffuse hepatic steatosis.    Numerous scattered subcentimeter mediastinal and axillary, right greater   than left lymph nodes of unknown etiology.    < end of copied text >        MEDICATIONS  (STANDING):  azithromycin   Tablet 250 milliGRAM(s) Oral daily  cefTRIAXone   IVPB 1 Gram(s) IV Intermittent every 24 hours  darunavir 800 mG/cobicstat 150 mG 1 Tablet(s) Oral daily  dolutegravir 50 milliGRAM(s) Oral daily  heparin  Injectable 5000 Unit(s) SubCutaneous every 8 hours  influenza   Vaccine 0.5 milliLiter(s) IntraMuscular once  lisinopril 5 milliGRAM(s) Oral daily  predniSONE   Tablet 40 milliGRAM(s) Oral every 12 hours  trimethoprim / sulfamethoxazole IVPB 500 milliGRAM(s) IV Intermittent every 8 hours    MEDICATIONS  (PRN):  acetaminophen   Tablet .. 650 milliGRAM(s) Oral every 6 hours PRN Temp greater or equal to 38C (100.4F)  acetaminophen   Tablet .. 650 milliGRAM(s) Oral every 6 hours PRN Moderate Pain (4 - 6)  ALBUTerol/ipratropium for Nebulization 3 milliLiter(s) Nebulizer every 6 hours PRN Shortness of Breath OVERNIGHT EVENTS: LB    SUBJECTIVE: Pt seen and examined at bedside. Endorsing dry cough, left rib pain, central abdominal pain (5/10) and hunger. I explained to him that he may be undergoing bronchoscopy today and is NPO for that reason. Otherwise denies fever, shortness of breath, n/v/d.     Vital Signs Last 12 Hrs  T(F): 98.1 (12-10-18 @ 04:07), Max: 98.9 (12-09-18 @ 20:55)  HR: 80 (12-10-18 @ 04:07) (80 - 80)  BP: 133/91 (12-10-18 @ 04:07) (129/93 - 133/91)  BP(mean): --  RR: 18 (12-10-18 @ 04:07) (18 - 18)  SpO2: 95% (12-10-18 @ 04:07) (95% - 100%)  I&O's Summary    09 Dec 2018 07:01  -  10 Dec 2018 07:00  --------------------------------------------------------  IN: 0 mL / OUT: 700 mL / NET: -700 mL    PHYSICAL EXAM:  Constitutional: WDWN resting comfortably in bed; NAD, no respiratory distress  Head: NC/AT  Eyes: PERRL, EOMI, anicteric sclera  ENT: no nasal discharge; uvula midline, thrush noted on uvula, dry mucous membranes  Neck: supple; no JVD or thyromegaly  Respiratory: scattered wheezing/rhonchi b/l, no conversational dyspnea  Cardiac: +S1/S2; RRR; no M/R/G  Gastrointestinal: soft, obese abdomen, NT/ND; no rebound or guarding; +BSx4  Back: spine midline, no bony tenderness or step-offs; no CVAT B/L  Extremities: WWP, no clubbing or cyanosis; no peripheral edema  Musculoskeletal: NROM x4; no joint swelling, tenderness or erythema  Vascular: 2+ radial, femoral, DP/PT pulses B/L  Dermatologic: skin warm, dry and intact; no rashes, wounds, or scars  Lymphatic: no submandibular or cervical LAD  Neurologic: AAOx3; CNII-XII grossly intact; no focal deficits  Psychiatric: affect and characteristics of appearance, verbalizations, behaviors are appropriate    LABS:                        13.7   4.2   )-----------( 178      ( 10 Dec 2018 05:53 )             40.4     12-10    128<L>  |  91<L>  |  10  ----------------------------<  136<H>  4.5   |  21<L>  |  1.00    Ca    9.3      10 Dec 2018 05:53  Mg     2.2     12-10    TPro  8.4<H>  /  Alb  4.5  /  TBili  0.5  /  DBili  x   /  AST  28  /  ALT  38  /  AlkPhos  73  12-10    PT/INR - ( 10 Dec 2018 05:53 )   PT: 13.3 sec;   INR: 1.17          PTT - ( 10 Dec 2018 05:53 )  PTT:28.8 sec    RADIOLOGY & ADDITIONAL TESTS:    < from: CT Chest No Cont (12.09.18 @ 02:47) >    IMPRESSION:   Patchy groundglass atelectasis/consolidation at the left lower lobe.   Findings may represent an evolving infiltrate. Cortically located with   the patient's presentation.    Diffuse hepatic steatosis.    Numerous scattered subcentimeter mediastinal and axillary, right greater   than left lymph nodes of unknown etiology.    < end of copied text >        MEDICATIONS  (STANDING):  azithromycin   Tablet 250 milliGRAM(s) Oral daily  cefTRIAXone   IVPB 1 Gram(s) IV Intermittent every 24 hours  darunavir 800 mG/cobicstat 150 mG 1 Tablet(s) Oral daily  dolutegravir 50 milliGRAM(s) Oral daily  heparin  Injectable 5000 Unit(s) SubCutaneous every 8 hours  influenza   Vaccine 0.5 milliLiter(s) IntraMuscular once  lisinopril 5 milliGRAM(s) Oral daily  predniSONE   Tablet 40 milliGRAM(s) Oral every 12 hours  trimethoprim / sulfamethoxazole IVPB 500 milliGRAM(s) IV Intermittent every 8 hours    MEDICATIONS  (PRN):  acetaminophen   Tablet .. 650 milliGRAM(s) Oral every 6 hours PRN Temp greater or equal to 38C (100.4F)  acetaminophen   Tablet .. 650 milliGRAM(s) Oral every 6 hours PRN Moderate Pain (4 - 6)  ALBUTerol/ipratropium for Nebulization 3 milliLiter(s) Nebulizer every 6 hours PRN Shortness of Breath

## 2018-12-10 NOTE — PROGRESS NOTE ADULT - PROBLEM SELECTOR PLAN 1
in setting of AIDS; appreciate Pulm recs; cont. ceftriaxone + azithromycin (for CAP), and TMP-SMX (for possible PCP, + steroids, given large A-a gradient); checking fungal serologies; wean off O2 as tolerated; plan for bronchoscopy

## 2018-12-10 NOTE — PROGRESS NOTE ADULT - PROBLEM SELECTOR PLAN 2
On Tivicay 50mg qd, Prezista 600 mg BID, cobisistat 150 mg qd. Follows with [facility]. Unknown last VL or CD4. Pt reporting not on ppx medications. Has missed x3 weeks of medications 2/2 running out of Rx.  -c/w Tivicay   -c/w Prezista/cobisistat (therapeutic interchange)  -f/u VL, CD4  -f/u HCV  -f/u quantiferon  -HIV consult tomorrow On Tivicay 50mg qd, Prezista 600 mg BID, cobisistat 150 mg qd. Follows with [facility]. Unknown last VL or CD4. Pt reporting not on ppx medications. Has missed x3 weeks of medications 2/2 running out of Rx.  -holding HAART until PCP is ruled out  -f/u VL, CD4  -f/u HCV  -f/u quantiferon  -HIV consult tomorrow

## 2018-12-10 NOTE — PROGRESS NOTE ADULT - ASSESSMENT
42M heterosexual with PMHx HIV (unknown VL or CD4, on tivicay, prezista, and cobisistat), [facility]/o progressive SOB, cough, SHAVER x3-4 weeks likely 2/2 CAP with CT chest showing LLL consolidation.

## 2018-12-10 NOTE — CONSULT NOTE ADULT - ASSESSMENT
42M [place]-speaking w/ HIV (unknown VL or CD4, on tivicay, prezista, and cobicistat and PMH HTN presenting from [facility] with c/o SOB, cough, SHAVER being managed for PCP pna.    #AIDS/PNA: pt noncompliant w/ ARV since July; now w/ concern for PCP pna; patient w/ prolonged stay in [facility] prior to sx onset, potentially placing pt at risk for fungal pneumonia (eg, Coccidiomycosis)   -f/u CD4, VL  -c/w bactrim and prednisone  -f/u bronch   -c/w supplemental O2; titrate as tolerated  -diflucan for thrush  -c/w CAP tx w/ CTX and azithromycin; pt will need MAC ppx (azithromycin 1200mg qweekly) upon completion of CAP tx  -obtain Legionella urine Ag  -f/u histoplasma, blastomycosis, coccidiomycosis, fungitell, TB labs, bcx  -hold ARV for now pending bronch and ARV clarification w/ PCP

## 2018-12-11 LAB
ANION GAP SERPL CALC-SCNC: 11 MMOL/L — SIGNIFICANT CHANGE UP (ref 5–17)
BUN SERPL-MCNC: 13 MG/DL — SIGNIFICANT CHANGE UP (ref 7–23)
CALCIUM SERPL-MCNC: 9.1 MG/DL — SIGNIFICANT CHANGE UP (ref 8.4–10.5)
CHLORIDE SERPL-SCNC: 97 MMOL/L — SIGNIFICANT CHANGE UP (ref 96–108)
CO2 SERPL-SCNC: 19 MMOL/L — LOW (ref 22–31)
CREAT ?TM UR-MCNC: 152 MG/DL — SIGNIFICANT CHANGE UP
CREAT SERPL-MCNC: 1.14 MG/DL — SIGNIFICANT CHANGE UP (ref 0.5–1.3)
FUNGITELL: <31 PG/ML — SIGNIFICANT CHANGE UP
GAMMA INTERFERON BACKGROUND BLD IA-ACNC: 0.04 IU/ML — SIGNIFICANT CHANGE UP
GLUCOSE SERPL-MCNC: 102 MG/DL — HIGH (ref 70–99)
HCT VFR BLD CALC: 37.1 % — LOW (ref 39–50)
HGB BLD-MCNC: 13.4 G/DL — SIGNIFICANT CHANGE UP (ref 13–17)
M TB IFN-G BLD-IMP: NEGATIVE — SIGNIFICANT CHANGE UP
M TB IFN-G CD4+ BCKGRND COR BLD-ACNC: 0 IU/ML — SIGNIFICANT CHANGE UP
M TB IFN-G CD4+CD8+ BCKGRND COR BLD-ACNC: 0 IU/ML — SIGNIFICANT CHANGE UP
MAGNESIUM SERPL-MCNC: 2.3 MG/DL — SIGNIFICANT CHANGE UP (ref 1.6–2.6)
MCHC RBC-ENTMCNC: 27.3 PG — SIGNIFICANT CHANGE UP (ref 27–34)
MCHC RBC-ENTMCNC: 36.1 G/DL — HIGH (ref 32–36)
MCV RBC AUTO: 75.7 FL — LOW (ref 80–100)
OSMOLALITY UR: 599 MOSMOL/KG — SIGNIFICANT CHANGE UP (ref 100–650)
PHOSPHATE SERPL-MCNC: 3.9 MG/DL — SIGNIFICANT CHANGE UP (ref 2.5–4.5)
PLATELET # BLD AUTO: 218 K/UL — SIGNIFICANT CHANGE UP (ref 150–400)
POTASSIUM SERPL-MCNC: 3.8 MMOL/L — SIGNIFICANT CHANGE UP (ref 3.5–5.3)
POTASSIUM SERPL-SCNC: 3.8 MMOL/L — SIGNIFICANT CHANGE UP (ref 3.5–5.3)
QUANT TB PLUS MITOGEN MINUS NIL: 5.77 IU/ML — SIGNIFICANT CHANGE UP
RBC # BLD: 4.9 M/UL — SIGNIFICANT CHANGE UP (ref 4.2–5.8)
RBC # FLD: 13.4 % — SIGNIFICANT CHANGE UP (ref 10.3–16.9)
SODIUM SERPL-SCNC: 127 MMOL/L — LOW (ref 135–145)
SODIUM UR-SCNC: 126 MMOL/L — SIGNIFICANT CHANGE UP
T4 FREE SERPL-MCNC: 1.01 NG/DL — SIGNIFICANT CHANGE UP (ref 0.7–1.48)
WBC # BLD: 7 K/UL — SIGNIFICANT CHANGE UP (ref 3.8–10.5)
WBC # FLD AUTO: 7 K/UL — SIGNIFICANT CHANGE UP (ref 3.8–10.5)

## 2018-12-11 PROCEDURE — 71045 X-RAY EXAM CHEST 1 VIEW: CPT | Mod: 26

## 2018-12-11 PROCEDURE — 99233 SBSQ HOSP IP/OBS HIGH 50: CPT

## 2018-12-11 PROCEDURE — 99232 SBSQ HOSP IP/OBS MODERATE 35: CPT

## 2018-12-11 PROCEDURE — 31624 DX BRONCHOSCOPE/LAVAGE: CPT

## 2018-12-11 RX ORDER — METOCLOPRAMIDE HCL 10 MG
10 TABLET ORAL ONCE
Qty: 0 | Refills: 0 | Status: COMPLETED | OUTPATIENT
Start: 2018-12-11 | End: 2018-12-11

## 2018-12-11 RX ORDER — ACETAMINOPHEN 500 MG
650 TABLET ORAL ONCE
Qty: 0 | Refills: 0 | Status: COMPLETED | OUTPATIENT
Start: 2018-12-11 | End: 2018-12-11

## 2018-12-11 RX ORDER — POTASSIUM CHLORIDE 20 MEQ
20 PACKET (EA) ORAL ONCE
Qty: 0 | Refills: 0 | Status: DISCONTINUED | OUTPATIENT
Start: 2018-12-11 | End: 2018-12-12

## 2018-12-11 RX ORDER — SODIUM CHLORIDE 9 MG/ML
500 INJECTION INTRAMUSCULAR; INTRAVENOUS; SUBCUTANEOUS ONCE
Qty: 0 | Refills: 0 | Status: COMPLETED | OUTPATIENT
Start: 2018-12-11 | End: 2018-12-11

## 2018-12-11 RX ORDER — ONDANSETRON 8 MG/1
4 TABLET, FILM COATED ORAL ONCE
Qty: 0 | Refills: 0 | Status: COMPLETED | OUTPATIENT
Start: 2018-12-11 | End: 2018-12-11

## 2018-12-11 RX ADMIN — Medication 650 MILLIGRAM(S): at 20:10

## 2018-12-11 RX ADMIN — CEFTRIAXONE 100 GRAM(S): 500 INJECTION, POWDER, FOR SOLUTION INTRAMUSCULAR; INTRAVENOUS at 01:28

## 2018-12-11 RX ADMIN — Medication 354.17 MILLIGRAM(S): at 07:27

## 2018-12-11 RX ADMIN — Medication 10 MILLIGRAM(S): at 19:42

## 2018-12-11 RX ADMIN — ONDANSETRON 4 MILLIGRAM(S): 8 TABLET, FILM COATED ORAL at 19:27

## 2018-12-11 RX ADMIN — Medication 650 MILLIGRAM(S): at 21:10

## 2018-12-11 RX ADMIN — SODIUM CHLORIDE 2000 MILLILITER(S): 9 INJECTION INTRAMUSCULAR; INTRAVENOUS; SUBCUTANEOUS at 20:11

## 2018-12-11 NOTE — PROGRESS NOTE ADULT - PROBLEM SELECTOR PLAN 1
Pt presenting with cough productive yellow sputum from previously white, SOB, and SHAVER. CT chest showing LLL consolidation vs. atelectasis. Pt with h/o HIV (unknown VL or CD4). Repeat ABG showing pO2 of 75. Pt in no respiratory distress on exam with scattered wheezing/rhonchi. No fever, leukocytosis.  -f/u RVP  -c/w CTX/azith  -duonebs PRN  -tylenol PRN fever and pain  -f/u blood cx  -sputum cx  -h/o HIV is concerning for possible PCP though pt grossly nontoxic and CT chest showing lobar consolidation more suggestive of CAP; A-a gradient <35 and pO2 >70 less concerning for PCP  -LDH mildly elevated Pt presenting with cough productive yellow sputum from previously white, SOB, and SHAVER. CT chest showing LLL consolidation vs. atelectasis. Pt with h/o HIV (unknown VL or CD4 on admission). Repeat ABG showing pO2 of 75. Pt in no respiratory distress on exam with scattered wheezing/rhonchi. No fever, leukocytosis.  -f/u RVP  -c/w CTX/azith  -duonebs PRN  -tylenol PRN fever and pain  -f/u blood cx  -sputum cx  -h/o HIV is concerning for possible PCP though pt grossly nontoxic and CT chest showing lobar consolidation more suggestive of CAP; A-a gradient <35 and pO2 >70 less concerning for PCP  -LDH mildly elevated Pt presenting with cough productive yellow sputum from previously white, SOB, and SHAVER. CT chest showing LLL consolidation vs. atelectasis. Pt with h/o HIV (unknown VL or CD4 on admission). Repeat ABG showing pO2 of 75. Pt in no respiratory distress on exam with scattered wheezing/rhonchi. No fever, leukocytosis.  -f/u RVP  -c/w CTX/azith for total of 7 day course (ends 12/17)  -duonebs PRN  -tylenol PRN fever and pain  -Blood cx NGTD  -sputum cx  -h/o HIV is concerning for possible PCP though pt grossly nontoxic and CT chest showing lobar consolidation more suggestive of CAP; A-a gradient <35 and pO2 >70 less concerning for PCP  -LDH mildly elevated

## 2018-12-11 NOTE — PROGRESS NOTE ADULT - PROBLEM SELECTOR PLAN 3
Na worsening; suspected dx SIADH d/t underlying PNA, but will check Sosm and urine lytes; monitor BMP; water restricting (NPO for bronch); TMP-SMX changed to PO (was in D5W)

## 2018-12-11 NOTE — PROGRESS NOTE ADULT - ASSESSMENT
41 yo male with HIV (on Tivicay, Prezista, and Cobisistat) with elevated VL(4.53 log) and CD4 count of 45, presenting with acute hypoxic respiratory failure and is currently being treated for CAP and empirically treated for possible PCP PNA.

## 2018-12-11 NOTE — PROGRESS NOTE ADULT - PROBLEM SELECTOR PLAN 4
Na 131 on admission, 128 today. Unclear etiology. Could be SIADH, idiopathic vs 2/2 PCP vs fluid overload though pt not getting fluid and is tolerating PO. Could be 2/2 legionella  - f/u urine lytes  - f/u urine legionella   - trend BMP Na 131 on admission, 127 today. Unclear etiology. Could be SIADH, idiopathic vs 2/2 PCP vs Legionalla vs fluid overload though pt not getting fluid and is tolerating PO.  -f/u urine lytes  -f/u urine legionella   -f/u serum osm  -trend BMP

## 2018-12-11 NOTE — PROGRESS NOTE ADULT - SUBJECTIVE AND OBJECTIVE BOX
Interval Events:  Patient seen and examined at bedside. The patient notes that he has some abdominal discomfort and notes that he is still SOB. He notes that when he is at rest in bed his is able to breathe comfortably on room air but with exertion he feels quite SOB. The patient notes a slight dry cough. Denies any other complaints.     MEDICATIONS:  Pulmonary:  ALBUTerol/ipratropium for Nebulization 3 milliLiter(s) Nebulizer every 6 hours PRN    Antimicrobials:  azithromycin   Tablet 250 milliGRAM(s) Oral daily  cefTRIAXone   IVPB 1 Gram(s) IV Intermittent every 24 hours  fluconAZOLE   Tablet 200 milliGRAM(s) Oral every 24 hours  trimethoprim / sulfamethoxazole IVPB 500 milliGRAM(s) IV Intermittent every 8 hours    Anticoagulants:    Cardiac:  lisinopril 5 milliGRAM(s) Oral daily    Endocrine:  predniSONE   Tablet 40 milliGRAM(s) Oral every 12 hours    Allergies    No Known Allergies    Intolerances        Vital Signs Last 24 Hrs  T(C): 36.6 (11 Dec 2018 08:37), Max: 37.1 (11 Dec 2018 05:05)  T(F): 97.9 (11 Dec 2018 08:37), Max: 98.8 (11 Dec 2018 05:05)  HR: 75 (11 Dec 2018 08:37) (73 - 91)  BP: 128/82 (11 Dec 2018 08:37) (124/80 - 156/104)  BP(mean): --  RR: 18 (11 Dec 2018 08:37) (17 - 18)  SpO2: 94% (11 Dec 2018 08:37) (93% - 96%)    General: NAD, AAO x3  HEENT: No icterus,. Moist mucous membranes  Neck: No JVD noted. Supple, no meningismus  Cardio: S1, S2 noted, RRR. No murmurs, rubs or gallops  Resp: Slight fine rales at b/l bases, otherwise clear. Good respiratory effort. No adventitious sounds  Abdo: Soft, NT, bowel sounds present. No organomegaly  Extremities: No edema noted. Pulses present b/l  Neuro: AAO x3, grossly normal motor strength.  Lymphnodes: no lymphadenopathy identified.  Skin: Dry, no rashes    LABS:  ABG - ( 09 Dec 2018 19:05 )  pH, Arterial: 7.41  pH, Blood: x     /  pCO2: 34    /  pO2: 86    / HCO3: 22    / Base Excess: -2.4  /  SaO2: 97                  CBC Full  -  ( 11 Dec 2018 06:43 )  WBC Count : 7.0 K/uL  Hemoglobin : 13.4 g/dL  Hematocrit : 37.1 %  Platelet Count - Automated : 218 K/uL  Mean Cell Volume : 75.7 fL  Mean Cell Hemoglobin : 27.3 pg  Mean Cell Hemoglobin Concentration : 36.1 g/dL  Auto Neutrophil # : x  Auto Lymphocyte # : x  Auto Monocyte # : x  Auto Eosinophil # : x  Auto Basophil # : x  Auto Neutrophil % : x  Auto Lymphocyte % : x  Auto Monocyte % : x  Auto Eosinophil % : x  Auto Basophil % : x    12-11    127<L>  |  97  |  13  ----------------------------<  102<H>  3.8   |  19<L>  |  1.14    Ca    9.1      11 Dec 2018 06:43  Phos  3.9     12-11  Mg     2.3     12-11    TPro  8.4<H>  /  Alb  4.5  /  TBili  0.5  /  DBili  x   /  AST  28  /  ALT  38  /  AlkPhos  73  12-10    PT/INR - ( 10 Dec 2018 05:53 )   PT: 13.3 sec;   INR: 1.17          PTT - ( 10 Dec 2018 05:53 )  PTT:28.8 sec                  RADIOLOGY & ADDITIONAL STUDIES (The following images were personally reviewed):

## 2018-12-11 NOTE — PROGRESS NOTE ADULT - SUBJECTIVE AND OBJECTIVE BOX
Visit Information Date & Time Provider Department Dept. Phone Encounter #  
 1/12/2017  5:[facility] [phone] [id number] Upcoming Health Maintenance Date Due ZOSTER VACCINE AGE 60> 6/14/1999 Pneumococcal 65+ Low/Medium Risk (2 of 2 - PPSV23) 4/13/2016 MEDICARE YEARLY EXAM 3/25/2017 GLAUCOMA SCREENING Q2Y 10/1/2017 DTaP/Tdap/Td series (2 - Td) 11/1/2026 Allergies as of 1/12/2017  Review Complete On: 1/12/2017 By: Sasha Ulrich Severity Noted Reaction Type Reactions Codeine  11/23/2009    Rash Neomycin  11/23/2009    Rash Polysporin [Bacitracin-polymyxin B]  11/23/2009    Rash Protonix [Pantoprazole]  11/23/2009    Other (comments) Gi upset Current Immunizations  Reviewed on 3/26/2015 Name Date Influenza High Dose Vaccine PF 11/4/2016, 10/7/2015 Influenza Vaccine Split 10/14/2012 Influenza Vaccine Whole 10/15/2011 Pneumococcal Conjugate (PCV-13) 4/13/2015 Tdap 11/1/2016 Not reviewed this visit You Were Diagnosed With   
  
 Codes Comments Viral URI    -  Primary ICD-10-CM: J06.9, B97.89 ICD-9-CM: 465.9 Vitals BP Pulse Temp Resp Height(growth percentile) Weight(growth percentile) 122/70 (BP 1 Location: Right arm, BP Patient Position: Sitting) 80 98.4 °F (36.9 °C) (Oral) 16 5' 0.75\" (1.543 m) 198 lb (89.8 kg) SpO2 BMI OB Status Smoking Status 98% 37.72 kg/m2 Hysterectomy Former Smoker BMI and BSA Data Body Mass Index Body Surface Area  
 37.72 kg/m 2 1.96 m 2 Preferred Pharmacy Pharmacy Name Phone [id number] [place] [phone] [phone] Your Updated Medication List  
  
   
This list is accurate as of: 1/12/17  6:25 PM.  Always use your most recent med list.  
  
  
  
  
 aspirin 81 mg tablet Take  by mouth. Benefiber Clear 3 gram/3.5 gram Powd Generic drug:  wheat dextrin Take  by mouth.  
  
 calcium citrate-vitamin d3 315-200 mg-unit Tab Commonly known as:  CITRACAL+D Take 1 Tab by mouth daily (with breakfast). DULoxetine 60 mg capsule Commonly known as:  CYMBALTA TAKE 1 CAPSULE EVERY DAY  
  
 famotidine 40 mg tablet Commonly known as:  PEPCID Take 40 mg by mouth daily. FLORASTOR PO Take  by mouth. fluticasone 50 mcg/actuation nasal spray Commonly known as:  Nixon Redo 2 Sprays by Both Nostrils route daily. guaiFENesin-codeine 100-10 mg/5 mL solution Commonly known as:  ROBITUSSIN AC Take 10 mL by mouth four (4) times daily as needed for Cough (severe cough). Max Daily Amount: 40 mL. lisinopril 30 mg tablet Commonly known as:  PRINIVIL, ZESTRIL  
TAKE 1 TABLET BY MOUTH EVERY DAY  
  
 LUTEIN PO Take  by mouth.  
  
 meloxicam 15 mg tablet Commonly known as:  MOBIC  
TAKE ONE TABLET BY MOUTH ONCE DAILY MIRALAX 17 gram packet Generic drug:  polyethylene glycol Take 17 g by mouth daily. MULTIVITAMIN PO Take  by mouth. BRANDON 128 2 % ophthalmic solution Generic drug:  sodium chloride Administer 1 Drop to both eyes as needed. oxybutynin 5 mg tablet Commonly known as:  GLSWWSEX Take 1 Tab by mouth three (3) times daily. ROBITUSSIN DM MAX PO Take  by mouth.  
  
 sotalol 120 mg tablet Commonly known as:  Delle Heraclio Take 120 mg by mouth two (2) times a day. SYNTHROID 150 mcg tablet Generic drug:  levothyroxine Take 1 Tab by mouth Daily (before breakfast). timolol 0.5 % ophthalmic solution Commonly known as:  TIMOPTIC Administer 1 Drop to right eye daily. VISION-ELTON PRESERVE PO Take  by mouth. Prescriptions Printed Refills  
 guaiFENesin-codeine (ROBITUSSIN AC) 100-10 mg/5 mL solution 0 Sig: Take 10 mL by mouth four (4) times daily as needed for Cough (severe cough). Max Daily Amount: 40 mL. Patient is a 42y old  Male who presents with a chief complaint of CAP (11 Dec 2018 09:20)      INTERVAL HPI/OVERNIGHT EVENTS:    Pt. seen and examined at 8:45AM  Cough, SHAVRE, and pleuritic CP slowly improving  Weaned off O2  No F/C    Review of Systems: 12 point review of systems otherwise negative    MEDICATIONS  (STANDING):  azithromycin   Tablet 250 milliGRAM(s) Oral daily  cefTRIAXone   IVPB 1 Gram(s) IV Intermittent every 24 hours  fluconAZOLE   Tablet 200 milliGRAM(s) Oral every 24 hours  lisinopril 5 milliGRAM(s) Oral daily  pantoprazole    Tablet 40 milliGRAM(s) Oral before breakfast  potassium chloride    Tablet ER 20 milliEquivalent(s) Oral once  predniSONE   Tablet 40 milliGRAM(s) Oral every 12 hours  trimethoprim  160 mG/sulfamethoxazole 800 mG 2 Tablet(s) Oral <User Schedule>    MEDICATIONS  (PRN):  acetaminophen   Tablet .. 650 milliGRAM(s) Oral every 6 hours PRN Moderate Pain (4 - 6)  acetaminophen   Tablet .. 650 milliGRAM(s) Oral every 6 hours PRN Temp greater or equal to 38C (100.4F)  ALBUTerol/ipratropium for Nebulization 3 milliLiter(s) Nebulizer every 6 hours PRN Shortness of Breath      Allergies    No Known Allergies    Intolerances          Vital Signs Last 24 Hrs  T(C): 36.6 (11 Dec 2018 08:37), Max: 37.1 (11 Dec 2018 05:05)  T(F): 97.9 (11 Dec 2018 08:37), Max: 98.8 (11 Dec 2018 05:05)  HR: 75 (11 Dec 2018 08:37) (73 - 91)  BP: 128/82 (11 Dec 2018 08:37) (124/80 - 156/104)  BP(mean): --  RR: 18 (11 Dec 2018 08:37) (17 - 18)  SpO2: 94% (11 Dec 2018 08:37) (93% - 96%)  CAPILLARY BLOOD GLUCOSE            Physical Exam:  (at 8:45AM)  Daily     Daily   General:  non-toxic and comfortable-appearing in NAD  HEENT:  MMM  CV:  RRR, no JVD  Lungs:  less crackles; normal WOB on RA  Abdomen:  soft NT ND  Extremities:  no edema B/L LE  Skin:  WWP  Neuro:  AAOx3    LABS:                        13.4   7.0   )-----------( 218      ( 11 Dec 2018 06:43 )             37.1     12-11    127<L>  |  97  |  13  ----------------------------<  102<H>  3.8   |  19<L>  |  1.14    Ca    9.1      11 Dec 2018 06:43  Phos  3.9     12-11  Mg     2.3     12-11    TPro  8.4<H>  /  Alb  4.5  /  TBili  0.5  /  DBili  x   /  AST  28  /  ALT  38  /  AlkPhos  73  12-10    PT/INR - ( 10 Dec 2018 05:53 )   PT: 13.3 sec;   INR: 1.17          PTT - ( 10 Dec 2018 05:53 )  PTT:28.8 sec        RADIOLOGY & ADDITIONAL TESTS:    ---------------------------------------------------------------------------  I personally reviewed: [  ]EKG   [  ]CXR    [  ] CT    [  ]Other  ---------------------------------------------------------------------------  PLEASE CHECK WHEN PRESENT:     [  ]Heart Failure     [  ] Acute     [  ] Acute on Chronic     [  ] Chronic  -------------------------------------------------------------------     [  ]Diastolic [HFpEF]     [  ]Systolic [HFrEF]     [  ]Combined [HFpEF & HFrEF]     [  ]Other:  -------------------------------------------------------------------  [  ]SABRINA     [  ]ATN     [  ]Reneal Medullary Necrosis     [  ]Renal Cortical Necrosis     [  ]Other Pathological Lesions:    [  ]CKD 1  [  ]CKD 2  [  ]CKD 3  [  ]CKD 4  [  ]CKD 5  [  ]Other  -------------------------------------------------------------------  [  ]Other/Unspecified:    --------------------------------------------------------------------    Abdominal Nutritional Status  [  ]Malnutrition: See Nutrition Note  [  ]Cachexia  [  ]Other:   [  ]Supplement Ordered:  [  ]Morbid Obesity (BMI >=40] Class: Print Route: Oral  
  
Introducing [facility]! Devendra Neal introduces [employer] patient portal. Now you can access parts of your medical record, email your doctor's office, and request medication refills online. 1. In your internet browser, go to [email or web address] 2. Click on the First Time User? Click Here link in the Sign In box. You will see the New Member Sign Up page. 3. Enter your [employer] Access Code exactly as it appears below. You will not need to use this code after youve completed the sign-up process. If you do not sign up before the expiration date, you must request a new code. · [employer] Access Code: [email or web address] Expires: 4/6/2017  3:09 PM 
 
4. Enter the last four digits of your Social Security Number (xxxx) and Date of Birth (mm/dd/yyyy) as indicated and click Submit. You will be taken to the next sign-up page. 5. Create a [employer] ID. This will be your [employer] login ID and cannot be changed, so think of one that is secure and easy to remember. 6. Create a [employer] password. You can change your password at any time. 7. Enter your Password Reset Question and Answer. This can be used at a later time if you forget your password. 8. Enter your e-mail address. You will receive e-mail notification when new information is available in [address] 9. Click Sign Up. You can now view and download portions of your medical record. 10. Click the Download Summary menu link to download a portable copy of your medical information. If you have questions, please visit the Frequently Asked Questions section of the [employer] website. Remember, [employer] is NOT to be used for urgent needs. For medical emergencies, dial 911. Now available from your iPhone and Android! Please provide this summary of care documentation to your next provider. Your primary care clinician is listed as Lyssa [phone]  If you have any questions after today's visit, please call [phone].

## 2018-12-11 NOTE — PROGRESS NOTE ADULT - PROBLEM SELECTOR PLAN 2
On Tivicay 50mg qd, Prezista 600 mg BID, cobisistat 150 mg qd. Follows with [facility]. Unknown last VL or CD4. Pt reporting not on ppx medications. Has missed x3 weeks of medications 2/2 running out of Rx.  -holding HAART until PCP is ruled out  -f/u VL, CD4  -f/u HCV  -f/u quantiferon  -HIV consult tomorrow On Tivicay 50mg qd, Prezista 600 mg BID, cobisistat 150 mg qd. Follows with [facility]. Unknown last VL or CD4. Pt reporting not on ppx medications. Has missed x3 weeks of medications 2/2 running out of Rx.  -holding HAART until PCP is ruled out  -CD4 45  -VL 33,000  -HCV negative  -f/u quantiferon  -HIV consult tomorrow On Tivicay 50mg qd, Prezista 600 mg BID, cobisistat 150 mg qd. Follows with [facility]. Unknown last VL or CD4. Pt reporting not on ppx medications. Has missed x3 weeks of medications 2/2 running out of Rx.  -holding HAART until PCP is ruled out  -CD4 45  -VL 33,000  -HCV negative  -f/u quantiferon  -HIV consult - f/u recs

## 2018-12-11 NOTE — PROGRESS NOTE ADULT - PROBLEM SELECTOR PLAN 1
-CT scan reviewed which demonstrates GGOs predominantly in the LLL. This is most suggestive of pneumonia and less typical for PCP which often more diffuse, however given patients immunocompromised state careful consideration must be given for opportunistic infections. Cocciodio and Histo are also possible given the patients travel history to [place] and [place].  -CT done at [facility] 6 months ago reportedly did not have any significant infiltrates -With chronic symptoms and possibly fleeting or waxing/ waning infiltrates, non-infectious etiologies such as  is also possible, however would need to r/o infection first.   -Point of care ultrasound done shows A-lines in the upper lung zones and A/B lines in the Left lower lung zones. No effusions noted.     Recommend:  -Bronchoscopy today.   -Empiric PCP tx with bactrim and steroids, will send studies from bronch and can tailor therapy from there.   -agree w/ CAP coverage with azithro and rocephin.   -agree w/ MAC ppx with azithro after treatment course fro PNA is completed.   -F/u serologies for fungal infections (coccidio, histo, blastomycosis)  - F/U Sputum cultures (bacterial and fungal) and B-D glucan

## 2018-12-11 NOTE — PROGRESS NOTE ADULT - PROBLEM SELECTOR PLAN 7
ppx: MARISOL DVT ppx: HSQ    Opportunistic infection ppx:  CD4 count 45  - will need prophylaxis against MAC with azithromycin 1,200mg once weekly when CAP treatment is done (12/17)  - will need continued prophylaxis against toxo with bactrim - one double strength tablet once daily if PCP is ruled out

## 2018-12-11 NOTE — PROGRESS NOTE ADULT - PROBLEM SELECTOR PLAN 1
in setting of AIDS; Pulm following; cont. ceftriaxone + azithromycin (for CAP), and TMP-SMX (for possible PCP, + steroids, given large A-a gradient); fungal serologies pending; weaned off O2; plan for bronchoscopy later today

## 2018-12-11 NOTE — BRIEF OPERATIVE NOTE - PROCEDURE
<<-----Click on this checkbox to enter Procedure Flexible bronchoscopy with bronchopulmonary lavage  12/11/2018    Active  SANJIV

## 2018-12-11 NOTE — PROGRESS NOTE ADULT - PROBLEM SELECTOR PLAN 6
F: none  E: Replete K>4, Mg>2  N: DASH/TLC F: none  E: Replete K>4, Mg>2  N: DASH/TLC    Code: Full Code

## 2018-12-11 NOTE — PROGRESS NOTE ADULT - SUBJECTIVE AND OBJECTIVE BOX
OVERNIGHT EVENTS:    SUBJECTIVE:    Vital Signs Last 12 Hrs  T(F): 98.8 (12-11-18 @ 05:05), Max: 98.8 (12-11-18 @ 05:05)  HR: 73 (12-11-18 @ 05:05) (73 - 73)  BP: 124/80 (12-11-18 @ 05:05) (124/80 - 156/104)  BP(mean): --  RR: 17 (12-11-18 @ 05:05) (17 - 18)  SpO2: 95% (12-11-18 @ 05:05) (95% - 96%)  I&O's Summary      PHYSICAL EXAM:  Constitutional: NAD, comfortable in bed.  HEENT: NC/AT, PERRLA, EOMI, no conjunctival pallor or scleral icterus, MMM  Neck: Supple, no JVD  Respiratory: Normal rate, rhythm, depth, effort. CTAB. No w/r/r.   Cardiovascular: RRR, normal S1 and S2, no m/r/g.   Gastrointestinal: +BS, soft NTND, no guarding or rebound tenderness, no palpable masses   Extremities: wwp; no cyanosis, clubbing or edema.   Vascular: Pulses equal and strong throughout.   Neurological: AAOx3, no CN deficits, strength and sensation intact throughout.   Skin: No gross skin abnormalities or rashes        LABS:                        13.4   7.0   )-----------( 218      ( 11 Dec 2018 06:43 )             37.1     12-11    127<L>  |  97  |  13  ----------------------------<  102<H>  3.8   |  19<L>  |  1.14    Ca    9.1      11 Dec 2018 06:43  Phos  3.9     12-11  Mg     2.3     12-11    TPro  8.4<H>  /  Alb  4.5  /  TBili  0.5  /  DBili  x   /  AST  28  /  ALT  38  /  AlkPhos  73  12-10    PT/INR - ( 10 Dec 2018 05:53 )   PT: 13.3 sec;   INR: 1.17          PTT - ( 10 Dec 2018 05:53 )  PTT:28.8 sec      RADIOLOGY & ADDITIONAL TESTS:    MEDICATIONS  (STANDING):  azithromycin   Tablet 250 milliGRAM(s) Oral daily  cefTRIAXone   IVPB 1 Gram(s) IV Intermittent every 24 hours  fluconAZOLE   Tablet 200 milliGRAM(s) Oral every 24 hours  lisinopril 5 milliGRAM(s) Oral daily  pantoprazole    Tablet 40 milliGRAM(s) Oral before breakfast  potassium chloride    Tablet ER 20 milliEquivalent(s) Oral once  predniSONE   Tablet 40 milliGRAM(s) Oral every 12 hours  trimethoprim / sulfamethoxazole IVPB 500 milliGRAM(s) IV Intermittent every 8 hours    MEDICATIONS  (PRN):  acetaminophen   Tablet .. 650 milliGRAM(s) Oral every 6 hours PRN Moderate Pain (4 - 6)  acetaminophen   Tablet .. 650 milliGRAM(s) Oral every 6 hours PRN Temp greater or equal to 38C (100.4F)  ALBUTerol/ipratropium for Nebulization 3 milliLiter(s) Nebulizer every 6 hours PRN Shortness of Breath OVERNIGHT EVENTS: LB    SUBJECTIVE: Pt seen and examined at bedside, spoken to via phone . Pt says he is feeling "regular," just has some pain in the stomach similar to pains of heartburn he has had in the past. Has been breathing well on room air. Denies fever, headache, chest pain, SOB, abdominal pain, n/v/d. Explained to him that he will be going for bronchoscopy today.     Vital Signs Last 12 Hrs  T(F): 98.8 (12-11-18 @ 05:05), Max: 98.8 (12-11-18 @ 05:05)  HR: 73 (12-11-18 @ 05:05) (73 - 73)  BP: 124/80 (12-11-18 @ 05:05) (124/80 - 156/104)  BP(mean): --  RR: 17 (12-11-18 @ 05:05) (17 - 18)  SpO2: 95% (12-11-18 @ 05:05) (95% - 96%)  I&O's Summary      PHYSICAL EXAM:  Constitutional: WDWN resting comfortably in bed; NAD, no respiratory distress  Head: NC/AT  Eyes: PERRL, EOMI, anicteric sclera  ENT: no nasal discharge; uvula midline, thrush noted on uvula, dry mucous membranes  Neck: supple; no JVD or thyromegaly  Respiratory: decreased breath sounds at the bases b/l  Cardiac: +S1/S2; RRR; no M/R/G  Gastrointestinal: soft, obese abdomen, NT/ND; no rebound or guarding; +BSx4  Back: spine midline, no bony tenderness or step-offs; no CVAT B/L  Extremities: WWP, no clubbing or cyanosis; no peripheral edema  Musculoskeletal: NROM x4; no joint swelling, tenderness or erythema  Vascular: 2+ radial, femoral, DP/PT pulses B/L  Dermatologic: skin warm, dry and intact; no rashes, wounds, or scars  Lymphatic: no submandibular or cervical LAD  Neurologic: AAOx3; CNII-XII grossly intact; no focal deficits  Psychiatric: affect and characteristics of appearance, verbalizations, behaviors are appropriate      LABS:                        13.4   7.0   )-----------( 218      ( 11 Dec 2018 06:43 )             37.1     12-11    127<L>  |  97  |  13  ----------------------------<  102<H>  3.8   |  19<L>  |  1.14    Ca    9.1      11 Dec 2018 06:43  Phos  3.9     12-11  Mg     2.3     12-11    TPro  8.4<H>  /  Alb  4.5  /  TBili  0.5  /  DBili  x   /  AST  28  /  ALT  38  /  AlkPhos  73  12-10    PT/INR - ( 10 Dec 2018 05:53 )   PT: 13.3 sec;   INR: 1.17          PTT - ( 10 Dec 2018 05:53 )  PTT:28.8 sec      RADIOLOGY & ADDITIONAL TESTS:    < from: CT Chest No Cont (12.09.18 @ 02:47) >    IMPRESSION:   Patchy groundglass atelectasis/consolidation at the left lower lobe.   Findings may represent an evolving infiltrate. Cortically located with   the patient's presentation.    Diffuse hepatic steatosis.    Numerous scattered subcentimeter mediastinal and axillary, right greater   than left lymph nodes of unknown etiology.    < end of copied text >      MEDICATIONS  (STANDING):  azithromycin   Tablet 250 milliGRAM(s) Oral daily  cefTRIAXone   IVPB 1 Gram(s) IV Intermittent every 24 hours  fluconAZOLE   Tablet 200 milliGRAM(s) Oral every 24 hours  lisinopril 5 milliGRAM(s) Oral daily  pantoprazole    Tablet 40 milliGRAM(s) Oral before breakfast  potassium chloride    Tablet ER 20 milliEquivalent(s) Oral once  predniSONE   Tablet 40 milliGRAM(s) Oral every 12 hours  trimethoprim / sulfamethoxazole IVPB 500 milliGRAM(s) IV Intermittent every 8 hours    MEDICATIONS  (PRN):  acetaminophen   Tablet .. 650 milliGRAM(s) Oral every 6 hours PRN Moderate Pain (4 - 6)  acetaminophen   Tablet .. 650 milliGRAM(s) Oral every 6 hours PRN Temp greater or equal to 38C (100.4F)  ALBUTerol/ipratropium for Nebulization 3 milliLiter(s) Nebulizer every 6 hours PRN Shortness of Breath Hospital Course    42M heterosexual with PMHx HIV (unknown VL or CD4 on admission, on tivicay, prezista, and cobisistat), HTN presents from [facility] with c/o progressive SOB, cough, SHAVER x3-4 weeks with CT chest showing LLL consolidation. Pt reported he was diagnosed with HIV at 14 and endorsed general good compliance with HAART, but ran out of medication ~1 month ago and had not been taking them.    Pt was started on ceftriaxone and azithromycin for empiric treatment of CAP, Bactrim for empiric treatment of PCP, and fluconazole for oral thrush.   CD4 resulted at 43, viral load 33,867. Pt's HAART was initially restarted, then held per ID recs until PCP is ruled out. Pulm has been following, performed bronchoscopy today.     OVERNIGHT EVENTS: LB    SUBJECTIVE: Pt seen and examined at bedside, spoken to via phone . Pt says he is feeling "regular," just has some pain in the stomach similar to pains of heartburn he has had in the past. Has been breathing well on room air. Denies fever, headache, chest pain, SOB, abdominal pain, n/v/d. Explained to him that he will be going for bronchoscopy today.     Vital Signs Last 12 Hrs  T(F): 98.8 (12-11-18 @ 05:05), Max: 98.8 (12-11-18 @ 05:05)  HR: 73 (12-11-18 @ 05:05) (73 - 73)  BP: 124/80 (12-11-18 @ 05:05) (124/80 - 156/104)  BP(mean): --  RR: 17 (12-11-18 @ 05:05) (17 - 18)  SpO2: 95% (12-11-18 @ 05:05) (95% - 96%)  I&O's Summary      PHYSICAL EXAM:  Constitutional: WDWN resting comfortably in bed; NAD, no respiratory distress  Head: NC/AT  Eyes: PERRL, EOMI, anicteric sclera  ENT: no nasal discharge; uvula midline, thrush noted on uvula, dry mucous membranes  Neck: supple; no JVD or thyromegaly  Respiratory: decreased breath sounds at the bases b/l  Cardiac: +S1/S2; RRR; no M/R/G  Gastrointestinal: soft, obese abdomen, NT/ND; no rebound or guarding; +BSx4  Back: spine midline, no bony tenderness or step-offs; no CVAT B/L  Extremities: WWP, no clubbing or cyanosis; no peripheral edema  Musculoskeletal: NROM x4; no joint swelling, tenderness or erythema  Vascular: 2+ radial, femoral, DP/PT pulses B/L  Dermatologic: skin warm, dry and intact; no rashes, wounds, or scars  Lymphatic: no submandibular or cervical LAD  Neurologic: AAOx3; CNII-XII grossly intact; no focal deficits  Psychiatric: affect and characteristics of appearance, verbalizations, behaviors are appropriate      LABS:                        13.4   7.0   )-----------( 218      ( 11 Dec 2018 06:43 )             37.1     12-11    127<L>  |  97  |  13  ----------------------------<  102<H>  3.8   |  19<L>  |  1.14    Ca    9.1      11 Dec 2018 06:43  Phos  3.9     12-11  Mg     2.3     12-11    TPro  8.4<H>  /  Alb  4.5  /  TBili  0.5  /  DBili  x   /  AST  28  /  ALT  38  /  AlkPhos  73  12-10    PT/INR - ( 10 Dec 2018 05:53 )   PT: 13.3 sec;   INR: 1.17          PTT - ( 10 Dec 2018 05:53 )  PTT:28.8 sec      RADIOLOGY & ADDITIONAL TESTS:    < from: CT Chest No Cont (12.09.18 @ 02:47) >    IMPRESSION:   Patchy groundglass atelectasis/consolidation at the left lower lobe.   Findings may represent an evolving infiltrate. Cortically located with   the patient's presentation.    Diffuse hepatic steatosis.    Numerous scattered subcentimeter mediastinal and axillary, right greater   than left lymph nodes of unknown etiology.    < end of copied text >      MEDICATIONS  (STANDING):  azithromycin   Tablet 250 milliGRAM(s) Oral daily  cefTRIAXone   IVPB 1 Gram(s) IV Intermittent every 24 hours  fluconAZOLE   Tablet 200 milliGRAM(s) Oral every 24 hours  lisinopril 5 milliGRAM(s) Oral daily  pantoprazole    Tablet 40 milliGRAM(s) Oral before breakfast  potassium chloride    Tablet ER 20 milliEquivalent(s) Oral once  predniSONE   Tablet 40 milliGRAM(s) Oral every 12 hours  trimethoprim / sulfamethoxazole IVPB 500 milliGRAM(s) IV Intermittent every 8 hours    MEDICATIONS  (PRN):  acetaminophen   Tablet .. 650 milliGRAM(s) Oral every 6 hours PRN Moderate Pain (4 - 6)  acetaminophen   Tablet .. 650 milliGRAM(s) Oral every 6 hours PRN Temp greater or equal to 38C (100.4F)  ALBUTerol/ipratropium for Nebulization 3 milliLiter(s) Nebulizer every 6 hours PRN Shortness of Breath Hospital Course    42M heterosexual with PMHx HIV (unknown VL or CD4 on admission, on tivicay, prezista, and cobisistat), HTN presents from [facility] with c/o progressive SOB, cough, SHAVER x3-4 weeks with CT chest showing LLL consolidation. Pt reported he was diagnosed with HIV at 14 and endorsed general good compliance with HAART, but ran out of medication ~1 month ago and had not been taking them. Pt was started on ceftriaxone and azithromycin for empiric treatment of CAP, Bactrim for empiric treatment of PCP, and fluconazole for oral thrush. CD4 resulted at 43, viral load 33,867. Pt's HAART was initially restarted, then held per ID recs until PCP is ruled out. Pulm has been following, performed bronchoscopy today. 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 Urine lytes and osmolality were sent today.     OVERNIGHT EVENTS: LB    SUBJECTIVE: Pt seen and examined at bedside, spoken to via phone . Pt says he is feeling "regular," just has some pain in the stomach similar to pains of heartburn he has had in the past. Has been breathing well on room air. Denies fever, headache, chest pain, SOB, abdominal pain, n/v/d. Explained to him that he will be going for bronchoscopy today.     Vital Signs Last 12 Hrs  T(F): 98.8 (12-11-18 @ 05:05), Max: 98.8 (12-11-18 @ 05:05)  HR: 73 (12-11-18 @ 05:05) (73 - 73)  BP: 124/80 (12-11-18 @ 05:05) (124/80 - 156/104)  BP(mean): --  RR: 17 (12-11-18 @ 05:05) (17 - 18)  SpO2: 95% (12-11-18 @ 05:05) (95% - 96%)  I&O's Summary      PHYSICAL EXAM:  Constitutional: WDWN resting comfortably in bed; NAD, no respiratory distress  Head: NC/AT  Eyes: PERRL, EOMI, anicteric sclera  ENT: no nasal discharge; uvula midline, thrush noted on uvula, dry mucous membranes  Neck: supple; no JVD or thyromegaly  Respiratory: decreased breath sounds at the bases b/l  Cardiac: +S1/S2; RRR; no M/R/G  Gastrointestinal: soft, obese abdomen, NT/ND; no rebound or guarding; +BSx4  Back: spine midline, no bony tenderness or step-offs; no CVAT B/L  Extremities: WWP, no clubbing or cyanosis; no peripheral edema  Musculoskeletal: NROM x4; no joint swelling, tenderness or erythema  Vascular: 2+ radial, femoral, DP/PT pulses B/L  Dermatologic: skin warm, dry and intact; no rashes, wounds, or scars  Lymphatic: no submandibular or cervical LAD  Neurologic: AAOx3; CNII-XII grossly intact; no focal deficits  Psychiatric: affect and characteristics of appearance, verbalizations, behaviors are appropriate      LABS:                        13.4   7.0   )-----------( 218      ( 11 Dec 2018 06:43 )             37.1     12-11    127<L>  |  97  |  13  ----------------------------<  102<H>  3.8   |  19<L>  |  1.14    Ca    9.1      11 Dec 2018 06:43  Phos  3.9     12-11  Mg     2.3     12-11    TPro  8.4<H>  /  Alb  4.5  /  TBili  0.5  /  DBili  x   /  AST  28  /  ALT  38  /  AlkPhos  73  12-10    PT/INR - ( 10 Dec 2018 05:53 )   PT: 13.3 sec;   INR: 1.17          PTT - ( 10 Dec 2018 05:53 )  PTT:28.8 sec      RADIOLOGY & ADDITIONAL TESTS:    < from: CT Chest No Cont (12.09.18 @ 02:47) >    IMPRESSION:   Patchy groundglass atelectasis/consolidation at the left lower lobe.   Findings may represent an evolving infiltrate. Cortically located with   the patient's presentation.    Diffuse hepatic steatosis.    Numerous scattered subcentimeter mediastinal and axillary, right greater   than left lymph nodes of unknown etiology.    < end of copied text >      MEDICATIONS  (STANDING):  azithromycin   Tablet 250 milliGRAM(s) Oral daily  cefTRIAXone   IVPB 1 Gram(s) IV Intermittent every 24 hours  fluconAZOLE   Tablet 200 milliGRAM(s) Oral every 24 hours  lisinopril 5 milliGRAM(s) Oral daily  pantoprazole    Tablet 40 milliGRAM(s) Oral before breakfast  potassium chloride    Tablet ER 20 milliEquivalent(s) Oral once  predniSONE   Tablet 40 milliGRAM(s) Oral every 12 hours  trimethoprim / sulfamethoxazole IVPB 500 milliGRAM(s) IV Intermittent every 8 hours    MEDICATIONS  (PRN):  acetaminophen   Tablet .. 650 milliGRAM(s) Oral every 6 hours PRN Moderate Pain (4 - 6)  acetaminophen   Tablet .. 650 milliGRAM(s) Oral every 6 hours PRN Temp greater or equal to 38C (100.4F)  ALBUTerol/ipratropium for Nebulization 3 milliLiter(s) Nebulizer every 6 hours PRN Shortness of Breath Hospital Course    42M heterosexual with PMHx HIV (unknown VL or CD4 on admission, on tivicay, prezista, and cobisistat), HTN presents from [facility] with c/o progressive SOB, cough, SHAVER x3-4 weeks with CT chest showing LLL consolidation. Pt reported he was diagnosed with HIV at 14 and endorsed general good compliance with HAART, but ran out of medication ~1 month ago and had not been taking them. Pt was started on ceftriaxone and azithromycin for empiric treatment of CAP, Bactrim for empiric treatment of PCP, and fluconazole for oral thrush. CD4 resulted at 43, viral load 33,867. Pt's HAART was initially restarted, then held per ID recs until PCP is ruled out. Pulm has been following, performed bronchoscopy today. 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 Urine lytes and osmolality were sent today.     OVERNIGHT EVENTS: LB    SUBJECTIVE: Pt seen and examined at bedside, spoken to via phone . Pt says he is feeling "regular," just has some pain in the stomach similar to pains of heartburn he has had in the past. Has been breathing well on room air. Denies fever, headache, chest pain, SOB, abdominal pain, n/v/d. Explained to him that he will be going for bronchoscopy today.     Vital Signs Last 12 Hrs  T(F): 98.8 (12-11-18 @ 05:05), Max: 98.8 (12-11-18 @ 05:05)  HR: 73 (12-11-18 @ 05:05) (73 - 73)  BP: 124/80 (12-11-18 @ 05:05) (124/80 - 156/104)  BP(mean): --  RR: 17 (12-11-18 @ 05:05) (17 - 18)  SpO2: 95% (12-11-18 @ 05:05) (95% - 96%)  I&O's Summary    PHYSICAL EXAM:  Constitutional: WDWN resting comfortably in bed; NAD, no respiratory distress  Head: NC/AT  Eyes: PERRL, EOMI, anicteric sclera  ENT: no nasal discharge; uvula midline, thrush noted on uvula, dry mucous membranes  Neck: supple; no JVD or thyromegaly  Respiratory: decreased breath sounds at the bases b/l  Cardiac: +S1/S2; RRR; no M/R/G  Gastrointestinal: soft, obese abdomen, NT/ND; no rebound or guarding; +BSx4  Back: spine midline, no bony tenderness or step-offs; no CVAT B/L  Extremities: WWP, no clubbing or cyanosis; no peripheral edema  Musculoskeletal: NROM x4; no joint swelling, tenderness or erythema  Vascular: 2+ radial, femoral, DP/PT pulses B/L  Dermatologic: skin warm, dry and intact; no rashes, wounds, or scars  Lymphatic: no submandibular or cervical LAD  Neurologic: AAOx3; CNII-XII grossly intact; no focal deficits  Psychiatric: affect and characteristics of appearance, verbalizations, behaviors are appropriate      LABS:                        13.4   7.0   )-----------( 218      ( 11 Dec 2018 06:43 )             37.1     12-11    127<L>  |  97  |  13  ----------------------------<  102<H>  3.8   |  19<L>  |  1.14    Ca    9.1      11 Dec 2018 06:43  Phos  3.9     12-11  Mg     2.3     12-11    TPro  8.4<H>  /  Alb  4.5  /  TBili  0.5  /  DBili  x   /  AST  28  /  ALT  38  /  AlkPhos  73  12-10    PT/INR - ( 10 Dec 2018 05:53 )   PT: 13.3 sec;   INR: 1.17          PTT - ( 10 Dec 2018 05:53 )  PTT:28.8 sec      RADIOLOGY & ADDITIONAL TESTS:    < from: CT Chest No Cont (12.09.18 @ 02:47) >    IMPRESSION:   Patchy groundglass atelectasis/consolidation at the left lower lobe.   Findings may represent an evolving infiltrate. Cortically located with   the patient's presentation.    Diffuse hepatic steatosis.    Numerous scattered subcentimeter mediastinal and axillary, right greater   than left lymph nodes of unknown etiology.    < end of copied text >      MEDICATIONS  (STANDING):  azithromycin   Tablet 250 milliGRAM(s) Oral daily  cefTRIAXone   IVPB 1 Gram(s) IV Intermittent every 24 hours  fluconAZOLE   Tablet 200 milliGRAM(s) Oral every 24 hours  lisinopril 5 milliGRAM(s) Oral daily  pantoprazole    Tablet 40 milliGRAM(s) Oral before breakfast  potassium chloride    Tablet ER 20 milliEquivalent(s) Oral once  predniSONE   Tablet 40 milliGRAM(s) Oral every 12 hours  trimethoprim / sulfamethoxazole IVPB 500 milliGRAM(s) IV Intermittent every 8 hours    MEDICATIONS  (PRN):  acetaminophen   Tablet .. 650 milliGRAM(s) Oral every 6 hours PRN Moderate Pain (4 - 6)  acetaminophen   Tablet .. 650 milliGRAM(s) Oral every 6 hours PRN Temp greater or equal to 38C (100.4F)  ALBUTerol/ipratropium for Nebulization 3 milliLiter(s) Nebulizer every 6 hours PRN Shortness of Breath

## 2018-12-11 NOTE — PROGRESS NOTE ADULT - ASSESSMENT
42M heterosexual with PMHx HIV (unknown VL or CD4, on tivicay, prezista, and cobisistat), [facility]N [facility] with c/o progressive SOB, cough, SHAVER x3-4 weeks likely 2/2 CAP with CT chest showing LLL consolidation.

## 2018-12-11 NOTE — BRIEF OPERATIVE NOTE - OPERATION/FINDINGS
The bronchoscope was introduced through the mouth in the trachea was visualized. Topical lidocaine administered. The trachea was entered and there was scant which mucus noted in the middle portion of the trachea which was suctioned. The trachea and henok were noted to be erythematous. The henok was visualized and was sharp. Topical lidocaine was administered in both mainstem bronchi. The right mainstem bronchus was entered and the RUL, RML, and RLL were visualized and were diffusely erythematous. There was scant white mucus which was suctioned. The scope was retracted and the left mainstem was entered. The LINH, Lingula, and LLL were all visualized and were grossly erythematous. Scant white mucus was noted and was suctioned. A LLL BAL was done with adequate return. The airway was examined to the subsegmental levels. No endobronchial lesions were seen    .  The instrument was then slowly withdrawn and removed.

## 2018-12-12 DIAGNOSIS — R11.10 VOMITING, UNSPECIFIED: ICD-10-CM

## 2018-12-12 DIAGNOSIS — K59.00 CONSTIPATION, UNSPECIFIED: ICD-10-CM

## 2018-12-12 LAB
ALBUMIN SERPL ELPH-MCNC: 4.1 G/DL — SIGNIFICANT CHANGE UP (ref 3.3–5)
ALP SERPL-CCNC: 63 U/L — SIGNIFICANT CHANGE UP (ref 40–120)
ALT FLD-CCNC: 46 U/L — HIGH (ref 10–45)
ANION GAP SERPL CALC-SCNC: 10 MMOL/L — SIGNIFICANT CHANGE UP (ref 5–17)
ANION GAP SERPL CALC-SCNC: 14 MMOL/L — SIGNIFICANT CHANGE UP (ref 5–17)
APPEARANCE UR: CLEAR — SIGNIFICANT CHANGE UP
AST SERPL-CCNC: 35 U/L — SIGNIFICANT CHANGE UP (ref 10–40)
B DERMAT AB FLD QL: NEGATIVE — SIGNIFICANT CHANGE UP
B PERT IGG+IGM PNL SER: SIGNIFICANT CHANGE UP
B PERT IGG+IGM PNL SER: SIGNIFICANT CHANGE UP
BASOPHILS NFR BLD AUTO: 0.3 % — SIGNIFICANT CHANGE UP (ref 0–2)
BILIRUB DIRECT SERPL-MCNC: <0.2 MG/DL — SIGNIFICANT CHANGE UP (ref 0–0.2)
BILIRUB INDIRECT FLD-MCNC: >0.3 MG/DL — SIGNIFICANT CHANGE UP (ref 0.2–1)
BILIRUB SERPL-MCNC: 0.5 MG/DL — SIGNIFICANT CHANGE UP (ref 0.2–1.2)
BILIRUB UR-MCNC: NEGATIVE — SIGNIFICANT CHANGE UP
BUN SERPL-MCNC: 13 MG/DL — SIGNIFICANT CHANGE UP (ref 7–23)
BUN SERPL-MCNC: 16 MG/DL — SIGNIFICANT CHANGE UP (ref 7–23)
CALCIUM SERPL-MCNC: 8.8 MG/DL — SIGNIFICANT CHANGE UP (ref 8.4–10.5)
CALCIUM SERPL-MCNC: 9 MG/DL — SIGNIFICANT CHANGE UP (ref 8.4–10.5)
CHLORIDE SERPL-SCNC: 95 MMOL/L — LOW (ref 96–108)
CHLORIDE SERPL-SCNC: 96 MMOL/L — SIGNIFICANT CHANGE UP (ref 96–108)
CO2 SERPL-SCNC: 20 MMOL/L — LOW (ref 22–31)
CO2 SERPL-SCNC: 21 MMOL/L — LOW (ref 22–31)
COLOR FLD: SIGNIFICANT CHANGE UP
COLOR FLD: SIGNIFICANT CHANGE UP
COLOR SPEC: YELLOW — SIGNIFICANT CHANGE UP
COMMENT - FLUIDS: SIGNIFICANT CHANGE UP
CREAT SERPL-MCNC: 1.25 MG/DL — SIGNIFICANT CHANGE UP (ref 0.5–1.3)
CREAT SERPL-MCNC: 1.44 MG/DL — HIGH (ref 0.5–1.3)
DIFF PNL FLD: NEGATIVE — SIGNIFICANT CHANGE UP
EOSINOPHIL # FLD: 4 % — SIGNIFICANT CHANGE UP
EOSINOPHIL # FLD: 5 % — SIGNIFICANT CHANGE UP
EOSINOPHIL NFR BLD AUTO: 1.5 % — SIGNIFICANT CHANGE UP (ref 0–6)
FLUID INTAKE SUBSTANCE CLASS: SIGNIFICANT CHANGE UP
FLUID INTAKE SUBSTANCE CLASS: SIGNIFICANT CHANGE UP
FLUID SEGMENTED GRANULOCYTES: 23 % — SIGNIFICANT CHANGE UP
FLUID SEGMENTED GRANULOCYTES: 86 % — SIGNIFICANT CHANGE UP
GLUCOSE SERPL-MCNC: 111 MG/DL — HIGH (ref 70–99)
GLUCOSE SERPL-MCNC: 95 MG/DL — SIGNIFICANT CHANGE UP (ref 70–99)
GLUCOSE UR QL: NEGATIVE — SIGNIFICANT CHANGE UP
GRAM STN FLD: SIGNIFICANT CHANGE UP
GRAM STN FLD: SIGNIFICANT CHANGE UP
HCT VFR BLD CALC: 38.6 % — LOW (ref 39–50)
HGB BLD-MCNC: 13.3 G/DL — SIGNIFICANT CHANGE UP (ref 13–17)
KETONES UR-MCNC: NEGATIVE — SIGNIFICANT CHANGE UP
LEGIONELLA AG UR QL: NEGATIVE — SIGNIFICANT CHANGE UP
LEUKOCYTE ESTERASE UR-ACNC: NEGATIVE — SIGNIFICANT CHANGE UP
LYMPHOCYTES # BLD AUTO: 22.1 % — SIGNIFICANT CHANGE UP (ref 13–44)
LYMPHOCYTES # FLD: 2 % — SIGNIFICANT CHANGE UP
LYMPHOCYTES # FLD: 7 % — SIGNIFICANT CHANGE UP
MAGNESIUM SERPL-MCNC: 2.2 MG/DL — SIGNIFICANT CHANGE UP (ref 1.6–2.6)
MCHC RBC-ENTMCNC: 26.4 PG — LOW (ref 27–34)
MCHC RBC-ENTMCNC: 34.5 G/DL — SIGNIFICANT CHANGE UP (ref 32–36)
MCV RBC AUTO: 76.6 FL — LOW (ref 80–100)
MONOCYTES NFR BLD AUTO: 8.6 % — SIGNIFICANT CHANGE UP (ref 2–14)
MONOS+MACROS # FLD: 15 % — SIGNIFICANT CHANGE UP
MONOS+MACROS # FLD: 8 % — SIGNIFICANT CHANGE UP
NEUTROPHILS NFR BLD AUTO: 67.5 % — SIGNIFICANT CHANGE UP (ref 43–77)
NIGHT BLUE STAIN TISS: SIGNIFICANT CHANGE UP
NIGHT BLUE STAIN TISS: SIGNIFICANT CHANGE UP
NITRITE UR-MCNC: NEGATIVE — SIGNIFICANT CHANGE UP
NON-GYNECOLOGICAL CYTOLOGY STUDY: SIGNIFICANT CHANGE UP
OSMOLALITY SERPL: 268 MOSM/KG — LOW (ref 280–301)
PH UR: 6.5 — SIGNIFICANT CHANGE UP (ref 5–8)
PHOSPHATE SERPL-MCNC: 4.2 MG/DL — SIGNIFICANT CHANGE UP (ref 2.5–4.5)
PLATELET # BLD AUTO: 204 K/UL — SIGNIFICANT CHANGE UP (ref 150–400)
POTASSIUM SERPL-MCNC: 3.9 MMOL/L — SIGNIFICANT CHANGE UP (ref 3.5–5.3)
POTASSIUM SERPL-MCNC: 4 MMOL/L — SIGNIFICANT CHANGE UP (ref 3.5–5.3)
POTASSIUM SERPL-SCNC: 3.9 MMOL/L — SIGNIFICANT CHANGE UP (ref 3.5–5.3)
POTASSIUM SERPL-SCNC: 4 MMOL/L — SIGNIFICANT CHANGE UP (ref 3.5–5.3)
PROT SERPL-MCNC: 8.3 G/DL — SIGNIFICANT CHANGE UP (ref 6–8.3)
PROT UR-MCNC: NEGATIVE MG/DL — SIGNIFICANT CHANGE UP
RBC # BLD: 5.04 M/UL — SIGNIFICANT CHANGE UP (ref 4.2–5.8)
RBC # FLD: 14 % — SIGNIFICANT CHANGE UP (ref 10.3–16.9)
RCV VOL RI: 1070 /UL — HIGH (ref 0–5)
RCV VOL RI: 1900 /UL — HIGH (ref 0–5)
SODIUM SERPL-SCNC: 126 MMOL/L — LOW (ref 135–145)
SODIUM SERPL-SCNC: 130 MMOL/L — LOW (ref 135–145)
SP GR SPEC: >=1.03 — SIGNIFICANT CHANGE UP (ref 1–1.03)
SPECIMEN SOURCE FLD: SIGNIFICANT CHANGE UP
SPECIMEN SOURCE FLD: SIGNIFICANT CHANGE UP
SPECIMEN SOURCE: SIGNIFICANT CHANGE UP
TOTAL NUCLEATED CELL COUNT, BODY FLUID: 332 /UL — HIGH (ref 0–5)
TOTAL NUCLEATED CELL COUNT, BODY FLUID: 50 /UL — HIGH (ref 0–5)
TUBE TYPE: SIGNIFICANT CHANGE UP
TUBE TYPE: SIGNIFICANT CHANGE UP
UROBILINOGEN FLD QL: 0.2 E.U./DL — SIGNIFICANT CHANGE UP
WBC # BLD: 7.8 K/UL — SIGNIFICANT CHANGE UP (ref 3.8–10.5)
WBC # FLD AUTO: 7.8 K/UL — SIGNIFICANT CHANGE UP (ref 3.8–10.5)

## 2018-12-12 PROCEDURE — 99233 SBSQ HOSP IP/OBS HIGH 50: CPT

## 2018-12-12 PROCEDURE — 74019 RADEX ABDOMEN 2 VIEWS: CPT | Mod: 26

## 2018-12-12 PROCEDURE — 99232 SBSQ HOSP IP/OBS MODERATE 35: CPT

## 2018-12-12 RX ORDER — AZITHROMYCIN 500 MG/1
250 TABLET, FILM COATED ORAL EVERY 24 HOURS
Qty: 0 | Refills: 0 | Status: DISCONTINUED | OUTPATIENT
Start: 2018-12-13 | End: 2018-12-14

## 2018-12-12 RX ORDER — AZITHROMYCIN 500 MG/1
250 TABLET, FILM COATED ORAL EVERY 24 HOURS
Qty: 0 | Refills: 0 | Status: DISCONTINUED | OUTPATIENT
Start: 2018-12-12 | End: 2018-12-12

## 2018-12-12 RX ORDER — FLUCONAZOLE 150 MG/1
200 TABLET ORAL EVERY 24 HOURS
Qty: 0 | Refills: 0 | Status: DISCONTINUED | OUTPATIENT
Start: 2018-12-12 | End: 2018-12-12

## 2018-12-12 RX ORDER — SENNA PLUS 8.6 MG/1
2 TABLET ORAL AT BEDTIME
Qty: 0 | Refills: 0 | Status: DISCONTINUED | OUTPATIENT
Start: 2018-12-12 | End: 2018-12-14

## 2018-12-12 RX ORDER — DOCUSATE SODIUM 100 MG
100 CAPSULE ORAL THREE TIMES A DAY
Qty: 0 | Refills: 0 | Status: DISCONTINUED | OUTPATIENT
Start: 2018-12-12 | End: 2018-12-14

## 2018-12-12 RX ORDER — POLYETHYLENE GLYCOL 3350 17 G/17G
17 POWDER, FOR SOLUTION ORAL ONCE
Qty: 0 | Refills: 0 | Status: COMPLETED | OUTPATIENT
Start: 2018-12-12 | End: 2018-12-12

## 2018-12-12 RX ORDER — METOCLOPRAMIDE HCL 10 MG
10 TABLET ORAL ONCE
Qty: 0 | Refills: 0 | Status: COMPLETED | OUTPATIENT
Start: 2018-12-12 | End: 2018-12-12

## 2018-12-12 RX ORDER — PANTOPRAZOLE SODIUM 20 MG/1
40 TABLET, DELAYED RELEASE ORAL
Qty: 0 | Refills: 0 | Status: DISCONTINUED | OUTPATIENT
Start: 2018-12-13 | End: 2018-12-14

## 2018-12-12 RX ORDER — FLUCONAZOLE 150 MG/1
200 TABLET ORAL EVERY 24 HOURS
Qty: 0 | Refills: 0 | Status: DISCONTINUED | OUTPATIENT
Start: 2018-12-13 | End: 2018-12-14

## 2018-12-12 RX ORDER — PANTOPRAZOLE SODIUM 20 MG/1
40 TABLET, DELAYED RELEASE ORAL EVERY 24 HOURS
Qty: 0 | Refills: 0 | Status: DISCONTINUED | OUTPATIENT
Start: 2018-12-12 | End: 2018-12-12

## 2018-12-12 RX ADMIN — Medication 10 MILLIGRAM(S): at 09:10

## 2018-12-12 RX ADMIN — CEFTRIAXONE 100 GRAM(S): 500 INJECTION, POWDER, FOR SOLUTION INTRAMUSCULAR; INTRAVENOUS at 01:07

## 2018-12-12 RX ADMIN — Medication 100 MILLIGRAM(S): at 22:23

## 2018-12-12 RX ADMIN — AZITHROMYCIN 250 MILLIGRAM(S): 500 TABLET, FILM COATED ORAL at 10:59

## 2018-12-12 RX ADMIN — PANTOPRAZOLE SODIUM 40 MILLIGRAM(S): 20 TABLET, DELAYED RELEASE ORAL at 09:10

## 2018-12-12 RX ADMIN — POLYETHYLENE GLYCOL 3350 17 GRAM(S): 17 POWDER, FOR SOLUTION ORAL at 12:57

## 2018-12-12 RX ADMIN — Medication 318.75 MILLIGRAM(S): at 12:58

## 2018-12-12 RX ADMIN — Medication 100 MILLIGRAM(S): at 12:57

## 2018-12-12 RX ADMIN — SENNA PLUS 2 TABLET(S): 8.6 TABLET ORAL at 22:23

## 2018-12-12 RX ADMIN — FLUCONAZOLE 100 MILLIGRAM(S): 150 TABLET ORAL at 09:10

## 2018-12-12 RX ADMIN — Medication 40 MILLIGRAM(S): at 12:57

## 2018-12-12 RX ADMIN — Medication 2 TABLET(S): at 22:23

## 2018-12-12 RX ADMIN — Medication 40 MILLIGRAM(S): at 23:54

## 2018-12-12 NOTE — PROGRESS NOTE ADULT - SUBJECTIVE AND OBJECTIVE BOX
HIV CONSULT FOLLOW UP NOTE    O/N & Interval Events: Febrile to 101.5 rectal. Underwent bronchoscopy, full result pending.   Subjective: Patient seen and examined at bedside. Communicated via [facility]  ID [id number]. Reports stable SOB and non-productive cough.     VITALS  Vital Signs Last 24 Hrs  T(C): 37 (12 Dec 2018 09:14), Max: 38.6 (11 Dec 2018 19:43)  T(F): 98.6 (12 Dec 2018 09:14), Max: 101.5 (11 Dec 2018 19:43)  HR: 88 (12 Dec 2018 09:14) (78 - 110)  BP: 134/78 (12 Dec 2018 09:14) (120/78 - 146/100)  BP(mean): 99 (11 Dec 2018 22:01) (99 - 99)  RR: 16 (12 Dec 2018 09:14) (7 - 18)  SpO2: 92% (12 Dec 2018 09:14) (92% - 95%)    I&O's Summary    12 Dec 2018 07:01  -  12 Dec 2018 14:35  --------------------------------------------------------  IN: 0 mL / OUT: 600 mL / NET: -600 mL        CAPILLARY BLOOD GLUCOSE    PHYSICAL EXAM  General: A&Ox 3; NAD. Unkempt but not cachectic in appearance.   Head: NC/AT;   Eyes: PERRL; EOMI; anicteric sclera  Neck: Supple; no JVD  Respiratory: Bibasilar inspiratory crackles, R>L auscultated w/ good air movement  Cardiovascular: Regular rhythm/tachycardic rate; S1/S2; no gallops or murmurs auscultated  Gastrointestinal: Soft; NT, distended, w/out rebound tenderness or guarding; bowel sounds normal  Extremities: WWP; no edema or cyanosis; radial/pedal pulses palpable  Neurological:  CNII-XII grossly intact; no obvious focal deficits  Skin: Excoriations diffusely on back, some on anterior chest and right shin.     MEDICATIONS  (STANDING):  azithromycin  IVPB 250 milliGRAM(s) IV Intermittent every 24 hours  cefTRIAXone   IVPB 1 Gram(s) IV Intermittent every 24 hours  docusate sodium 100 milliGRAM(s) Oral three times a day  fluconAZOLE IVPB 200 milliGRAM(s) IV Intermittent every 24 hours  lisinopril 5 milliGRAM(s) Oral daily  pantoprazole  Injectable 40 milliGRAM(s) IV Push every 24 hours  predniSONE   Tablet 40 milliGRAM(s) Oral every 12 hours  senna 2 Tablet(s) Oral at bedtime  trimethoprim / sulfamethoxazole IVPB 500 milliGRAM(s) IV Intermittent every 8 hours    MEDICATIONS  (PRN):  acetaminophen   Tablet .. 650 milliGRAM(s) Oral every 6 hours PRN Moderate Pain (4 - 6)  acetaminophen   Tablet .. 650 milliGRAM(s) Oral every 6 hours PRN Temp greater or equal to 38C (100.4F)  ALBUTerol/ipratropium for Nebulization 3 milliLiter(s) Nebulizer every 6 hours PRN Shortness of Breath  benzonatate 100 milliGRAM(s) Oral every 8 hours PRN Cough      LABS                        13.3   7.8   )-----------( 204      ( 12 Dec 2018 06:07 )             38.6     12-12    126<L>  |  96  |  13  ----------------------------<  95  3.9   |  20<L>  |  1.25    Ca    9.0      12 Dec 2018 06:08  Phos  4.2     12-12  Mg     2.2     12-12    Culture - Acid Fast - Bronchial w/Smear (18 @ 01:31)    Specimen Source: .Broncial None    Acid Fast Bacilli Smear:   No acid fast bacilli seen by fluorochrome stain    TPro  8.3  /  Alb  4.1  /  TBili  0.5  /  DBili  <0.2  /  AST  35  /  ALT  46<H>  /  AlkPhos  63  12-12    LIVER FUNCTIONS - ( 12 Dec 2018 06:08 )  Alb: 4.1 g/dL / Pro: 8.3 g/dL / ALK PHOS: 63 U/L / ALT: 46 U/L / AST: 35 U/L / GGT: x             Urinalysis Basic - ( 12 Dec 2018 10:36 )    Color: Yellow / Appearance: Clear / SG: >=1.030 / pH: x  Gluc: x / Ketone: NEGATIVE  / Bili: Negative / Urobili: 0.2 E.U./dL   Blood: x / Protein: NEGATIVE mg/dL / Nitrite: NEGATIVE   Leuk Esterase: NEGATIVE / RBC: x / WBC x   Sq Epi: x / Non Sq Epi: x / Bacteria: x    Culture - Fungal, Bronchial (18 @ 01:31)    Specimen Source: .Broncial None    Culture Results:   Testing in progress    Culture - Bronchial (18 @ 22:15)    Gram Stain:   No epithelial cells  Few bronchial cells  Moderate White blood cells  Rare Gram positive cocci in pairs  Rare Gram Negative Diplococci  Rare Yeast    Specimen Source: Lavage LLL BAL    Culture Results:   Culture in progress      T Cell Subset (12.10.18 @ 05:01)    CD4 %: 6 %    ABS CD4: 45 /uL    HIV-1 RNA Quantitative, Viral Load (18 @ 22:43)    HIV-1 RNA Quantitative, Viral Load:   33,867    HIV-1 RNA Quantitative, Vir Load Interp: See Comment METHOD: Transcription Mediated Amplification (TMA) – [employer].  Results from HIV-1 RNA tests that use other  methods might differ.  Abbreviations:  DET. = Detected,  not det. = Not Detected  n/a = not available  .    HIV-1 RNA Quantitative, Viral Load Lo.53    HIV-1 Viral Load Result: DET.          IMAGING/EKG/ETC: Reviewed

## 2018-12-12 NOTE — DIETITIAN INITIAL EVALUATION ADULT. - NS AS NUTRI INTERV MEALS SNACK
General/healthful diet/Energy - modified diet/Vitamin - modified diet/Fiber - modified diet/Fluid - modified diet/Mineral - modified diet/Protein - modified diet/Add high fiber sources due to constipation

## 2018-12-12 NOTE — PROGRESS NOTE ADULT - PROBLEM SELECTOR PLAN 5
-jenny SALAZARN Na 131 on admission, 127 today. Unclear etiology. Could be SIADH, idiopathic vs 2/2 PCP vs Legionella   -urine sodium 126 and urine osmolality 599 more consistent with SIADH   - urine legionella negative    -trend BMP  - free water restriction Na 131 on admission, 127 today. Unclear etiology. Could be SIADH, idiopathic vs 2/2 PCP vs Legionella   -urine sodium 126 with low serum osm at 268 and urine osmolality 599 (high) which is more consistent with SIADH   - urine legionella negative    - trend BMP  - free water restriction

## 2018-12-12 NOTE — PROGRESS NOTE ADULT - PROBLEM SELECTOR PLAN 1
Pt presenting with cough productive yellow sputum from previously white, SOB, and SHAVER. CT chest showing LLL consolidation vs. atelectasis. Pt with h/o HIV (unknown VL or CD4 on admission). Repeat ABG showing pO2 of 75. Pt in no respiratory distress on exam with scattered wheezing/rhonchi. No fever, leukocytosis.  -f/u RVP  -c/w CTX/azith for total of 7 day course (ends 12/17)  -duonebs PRN  -tylenol PRN fever and pain  -Blood cx NGTD  -sputum cx  -h/o HIV is concerning for possible PCP though pt grossly nontoxic and CT chest showing lobar consolidation more suggestive of CAP; A-a gradient <35 and pO2 >70 less concerning for PCP  -LDH mildly elevated Pt presenting with cough productive yellow sputum from previously white, SOB, and SHAVER. CT chest showing LLL consolidation vs. atelectasis. Pt with h/o HIV (unknown VL or CD4 on admission). Repeat ABG showing pO2 of 75. Pt in no respiratory distress on exam with scattered wheezing/rhonchi. No fever, leukocytosis.  -RVP undetected   -c/w CTX/azith for total of 7 day course (ends 12/17)  -duonebs PRN  -tylenol PRN fever and pleuritic chest pain   -Blood cx NGTD  -h/o HIV is concerning for possible PCP though pt grossly nontoxic and CT chest showing lobar consolidation more suggestive of CAP; A-a gradient <35 and pO2 >70 less concerning for PCP  -LDH mildly elevated  - bronch results gram negative diplococci, moderate gram positive in pairs chains and clusters and moderate WBC Pt presenting with cough productive yellow sputum from previously white, SOB, and SHAVER. CT chest showing LLL consolidation vs. atelectasis. Pt with h/o HIV (unknown VL or CD4 on admission). Repeat ABG showing pO2 of 75. Pt in no respiratory distress on exam with scattered wheezing/rhonchi. No fever, leukocytosis.  -RVP undetected   -c/w CTX/azith for total of 7 day course (ends 12/17)  -duonebs PRN  -tylenol PRN fever and pleuritic chest pain   -Blood cx NGTD  -h/o HIV is concerning for possible PCP though pt grossly nontoxic and CT chest showing lobar consolidation more suggestive of CAP; A-a gradient <35 and pO2 >70 less concerning for PCP but will continue treatment with prednisone and bactrim  -LDH mildly elevated  - bronch results gram negative diplococci, moderate gram positive in pairs chains and clusters and moderate WBC

## 2018-12-12 NOTE — PROGRESS NOTE ADULT - PROBLEM SELECTOR PLAN 3
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 water restriction; TMP-SMX changed to PO (was in D5W); if sodium continues to down-trend, will liberalize dietary salt intake

## 2018-12-12 NOTE — DIETITIAN INITIAL EVALUATION ADULT. - ENERGY NEEDS
Ht:5ft 8inches,IBW:154lbs +/-10%,146% of IBW.BMI:34.2.Increasd kcal/protein and nutrient needs due to AIDS/fevers.

## 2018-12-12 NOTE — PROGRESS NOTE ADULT - ASSESSMENT
42M heterosexual with PMHx HIV (unknown VL or CD4, on tivicay, prezista, and cobisistat), [facility]/o progressive SOB, cough, SHAVER x3-4 weeks likely 2/2 CAP with CT chest showing LLL consolidation.

## 2018-12-12 NOTE — DIETITIAN INITIAL EVALUATION ADULT. - OTHER INFO
41 y/o male with AIDS admitted with fevers/SOB and CAP. Now with hyponatremia. Presently eating fair amounts due to complaints of stomach pain from suspected constipation.Had N/V but denied at present. No N/V/D or skin breakdown..As per diet recall(little english/Tamazight) patient had not prescribed to any restrictions PTA.RD educated patient on DASH diet and reviewed rationale for fluid restriction.

## 2018-12-12 NOTE — PROGRESS NOTE ADULT - SUBJECTIVE AND OBJECTIVE BOX
Patient is a 42y old  Male who presents with a chief complaint of CAP (12 Dec 2018 14:34)      INTERVAL HPI/OVERNIGHT EVENTS:    Pt. seen and examined at 11:30AM  O/N Events noted -- Pt. w/ F/C and N/V s/p bronch; sx since resolved  Pt. c/o constipation  SHAVER, pleuritic CP, and cough slowly improving  Pt. using I.S.    Review of Systems: 12 point review of systems otherwise negative    MEDICATIONS  (STANDING):  cefTRIAXone   IVPB 1 Gram(s) IV Intermittent every 24 hours  docusate sodium 100 milliGRAM(s) Oral three times a day  lisinopril 5 milliGRAM(s) Oral daily  predniSONE   Tablet 40 milliGRAM(s) Oral every 12 hours  senna 2 Tablet(s) Oral at bedtime  trimethoprim  160 mG/sulfamethoxazole 800 mG 2 Tablet(s) Oral <User Schedule>    MEDICATIONS  (PRN):  acetaminophen   Tablet .. 650 milliGRAM(s) Oral every 6 hours PRN Moderate Pain (4 - 6)  acetaminophen   Tablet .. 650 milliGRAM(s) Oral every 6 hours PRN Temp greater or equal to 38C (100.4F)  ALBUTerol/ipratropium for Nebulization 3 milliLiter(s) Nebulizer every 6 hours PRN Shortness of Breath  benzonatate 100 milliGRAM(s) Oral every 8 hours PRN Cough      Allergies    No Known Allergies    Intolerances          Vital Signs Last 24 Hrs  T(C): 37 (12 Dec 2018 09:14), Max: 38.6 (11 Dec 2018 19:43)  T(F): 98.6 (12 Dec 2018 09:14), Max: 101.5 (11 Dec 2018 19:43)  HR: 88 (12 Dec 2018 09:14) (78 - 110)  BP: 134/78 (12 Dec 2018 09:14) (120/78 - 146/100)  BP(mean): 99 (11 Dec 2018 22:01) (99 - 99)  RR: 16 (12 Dec 2018 09:14) (7 - 18)  SpO2: 92% (12 Dec 2018 09:14) (92% - 95%)  CAPILLARY BLOOD GLUCOSE          -12 @ 07:01  -  12-12 @ 14:51  --------------------------------------------------------  IN: 0 mL / OUT: 600 mL / NET: -600 mL        Physical Exam:  (at 11:30AM)  Daily     Daily Weight in k.8 (12 Dec 2018 14:14)  General:  Well appearing, NAD, not cachetic  HEENT:  Nonicteric, PERRLA  CV:  RRR, no murmur, no JVD  Lungs:  CTA B/L, no wheezes, rales, rhonchi  Abdomen:  Soft, non-tender, no distended, positive BS, no hepatosplenomegaly  Extremities:  2+ pulses, no c/c, no edema  Skin:  Warm and dry, no rashes  :  No coe  Neuro:  AAOx3, non-focal, CN II-XII grossly intact  No Restraints    LABS:                        13.3   7.8   )-----------( 204      ( 12 Dec 2018 06:07 )             38.6     12-12    126<L>  |  96  |  13  ----------------------------<  95  3.9   |  20<L>  |  1.25    Ca    9.0      12 Dec 2018 06:08  Phos  4.2     12-12  Mg     2.2     12-12    TPro  8.3  /  Alb  4.1  /  TBili  0.5  /  DBili  <0.2  /  AST  35  /  ALT  46<H>  /  AlkPhos  63  12-12      Urinalysis Basic - ( 12 Dec 2018 10:36 )    Color: Yellow / Appearance: Clear / SG: >=1.030 / pH: x  Gluc: x / Ketone: NEGATIVE  / Bili: Negative / Urobili: 0.2 E.U./dL   Blood: x / Protein: NEGATIVE mg/dL / Nitrite: NEGATIVE   Leuk Esterase: NEGATIVE / RBC: x / WBC x   Sq Epi: x / Non Sq Epi: x / Bacteria: x          RADIOLOGY & ADDITIONAL TESTS:    ---------------------------------------------------------------------------  I personally reviewed: [  ]EKG   [  ]CXR    [  ] CT    [  ]Other  ---------------------------------------------------------------------------  PLEASE CHECK WHEN PRESENT:     [  ]Heart Failure     [  ] Acute     [  ] Acute on Chronic     [  ] Chronic  -------------------------------------------------------------------     [  ]Diastolic [HFpEF]     [  ]Systolic [HFrEF]     [  ]Combined [HFpEF & HFrEF]     [  ]Other:  -------------------------------------------------------------------  [  ]SABRINA     [  ]ATN     [  ]Reneal Medullary Necrosis     [  ]Renal Cortical Necrosis     [  ]Other Pathological Lesions:    [  ]CKD 1  [  ]CKD 2  [  ]CKD 3  [  ]CKD 4  [  ]CKD 5  [  ]Other  -------------------------------------------------------------------  [  ]Other/Unspecified:    --------------------------------------------------------------------    Abdominal Nutritional Status  [  ]Malnutrition: See Nutrition Note  [  ]Cachexia  [  ]Other:   [  ]Supplement Ordered:  [  ]Morbid Obesity (BMI >=40]

## 2018-12-12 NOTE — PROGRESS NOTE ADULT - PROBLEM SELECTOR PLAN 1
in setting of AIDS; Pulm following; cont. ceftriaxone + azithromycin (for CAP), and TMP-SMX (for possible PCP, + steroids, given large A-a gradient); fungal serologies pending; weaned off O2; s/p bronch 12/11, f/u results

## 2018-12-12 NOTE — PROGRESS NOTE ADULT - PROBLEM SELECTOR PLAN 2
On Tivicay 50mg qd, Prezista 600 mg BID, cobisistat 150 mg qd. Follows with [facility]. Unknown last VL or CD4. Pt reporting not on ppx medications. Has missed x3 weeks of medications 2/2 running out of Rx.  -holding HAART until PCP is ruled out  -CD4 45  -VL 33,000  -HCV negative  - Quantiferon negative   - f/u HIV recs

## 2018-12-12 NOTE — PROGRESS NOTE ADULT - PROBLEM SELECTOR PLAN 7
DVT ppx: HSQ    Opportunistic infection ppx:  CD4 count 45  - will need prophylaxis against MAC with azithromycin 1,200mg once weekly when CAP treatment is done (12/17)  - will need continued prophylaxis against toxo with bactrim - one double strength tablet once daily if PCP is ruled out DVT ppx: none patient is ambulating     Opportunistic infection ppx:  CD4 count 45  - will need prophylaxis against MAC with azithromycin 1,200mg once weekly when CAP treatment is done (12/17)  - will need continued prophylaxis against toxo with bactrim - one double strength tablet once daily if PCP is ruled out F: none  E: Replete K>4, Mg>2  N: DASH/TLC    Code: Full Code

## 2018-12-12 NOTE — PROGRESS NOTE ADULT - PROBLEM SELECTOR PLAN 3
-c/w home lisinopril 5 mg qd vomiting and nausea after bronch done on 12/11  patient passing gas but no bowel movement for the past 2 days   concern for obstruction although less likely and stat abdominal xray ordered which showed stool and no free air   patient tolerating breakfast this AM 12/12 and will continue to monitor   started on bowel regiment for constipation

## 2018-12-12 NOTE — PROGRESS NOTE ADULT - ASSESSMENT
43 yo male with HIV (on Tivicay, Prezista, and Cobisistat) with elevated VL(4.53 log) and CD4 count of 45, presenting with acute hypoxic respiratory failure and is currently being treated for CAP and empirically treated for possible PCP PNA.

## 2018-12-12 NOTE — DIETITIAN INITIAL EVALUATION ADULT. - PROBLEM SELECTOR PLAN 2
On Tivicay 50mg qd, Prezista 600 mg BID, cobisistat 150 mg qd. Follows with [facility]. Unknown last VL or CD4. Pt reporting not on ppx medications. Has missed x3 weeks of medications 2/2 running out of Rx.  -c/w Tivicay   -c/w Prezista/cobisistat (therapeutic interchange)  -f/u VL, CD4  -f/u HCV  -f/u quantiferon  -HIV consult tomorrow

## 2018-12-12 NOTE — PROGRESS NOTE ADULT - PROBLEM SELECTOR PLAN 4
Na 131 on admission, 127 today. Unclear etiology. Could be SIADH, idiopathic vs 2/2 PCP vs Legionella   -f/u urine lytes  -f/u urine legionella   -f/u serum osm  -trend BMP -c/w home lisinopril 5 mg qd

## 2018-12-12 NOTE — DIETITIAN INITIAL EVALUATION ADULT. - PROBLEM SELECTOR PLAN 1
Pt presenting with cough productive yellow sputum from previously white, SOB, and SHAVER. CT chest showing LLL consolidation vs. atelectasis. Pt with h/o HIV (unknown VL or CD4). Repeat ABG showing pO2 of 75. Pt in no respiratory distress on exam with scattered wheezing/rhonchi. No fever, leukocytosis.  -f/u RVP  -c/w CTX/azith  -duonebs PRN  -tylenol PRN fever and pain  -f/u blood cx  -sputum cx  -h/o HIV is concerning for possible PCP though pt grossly nontoxic and CT chest showing lobar consolidation more suggestive of CAP; A-a gradient <35 and pO2 >70 less concerning for PCP  -will send LDH- f/u

## 2018-12-12 NOTE — PROGRESS NOTE ADULT - SUBJECTIVE AND OBJECTIVE BOX
OVERNIGHT EVENTS:     SUBJECTIVE:    Vital Signs Last 12 Hrs  T(F): 98.5 (12-12-18 @ 04:12), Max: 100.3 (12-11-18 @ 22:01)  HR: 78 (12-12-18 @ 04:12) (78 - 104)  BP: 120/78 (12-12-18 @ 04:12) (120/78 - 132/83)  BP(mean): 99 (12-11-18 @ 22:01) (99 - 99)  RR: 7 (12-12-18 @ 04:12) (7 - 18)  SpO2: 94% (12-12-18 @ 04:12) (92% - 94%)  I&O's Summary      PHYSICAL EXAM:  Constitutional: NAD, comfortable in bed.  HEENT: NC/AT, PERRLA, EOMI, no conjunctival pallor or scleral icterus, MMM  Neck: Supple, no JVD  Respiratory: Normal rate, rhythm, depth, effort. CTAB. No w/r/r.   Cardiovascular: RRR, normal S1 and S2, no m/r/g.   Gastrointestinal: +BS, soft NTND, no guarding or rebound tenderness, no palpable masses   Extremities: wwp; no cyanosis, clubbing or edema.   Vascular: Pulses equal and strong throughout.   Neurological: AAOx3, no CN deficits, strength and sensation intact throughout.   Skin: No gross skin abnormalities or rashes        LABS:                        13.3   7.8   )-----------( 204      ( 12 Dec 2018 06:07 )             38.6     12-12    126<L>  |  96  |  13  ----------------------------<  95  3.9   |  20<L>  |  1.25    Ca    9.0      12 Dec 2018 06:08  Phos  4.2     12-12  Mg     2.2     12-12            RADIOLOGY & ADDITIONAL TESTS:    MEDICATIONS  (STANDING):  azithromycin   Tablet 250 milliGRAM(s) Oral daily  cefTRIAXone   IVPB 1 Gram(s) IV Intermittent every 24 hours  fluconAZOLE   Tablet 200 milliGRAM(s) Oral every 24 hours  lisinopril 5 milliGRAM(s) Oral daily  pantoprazole    Tablet 40 milliGRAM(s) Oral before breakfast  potassium chloride    Tablet ER 20 milliEquivalent(s) Oral once  predniSONE   Tablet 40 milliGRAM(s) Oral every 12 hours  trimethoprim  160 mG/sulfamethoxazole 800 mG 2 Tablet(s) Oral <User Schedule>    MEDICATIONS  (PRN):  acetaminophen   Tablet .. 650 milliGRAM(s) Oral every 6 hours PRN Moderate Pain (4 - 6)  acetaminophen   Tablet .. 650 milliGRAM(s) Oral every 6 hours PRN Temp greater or equal to 38C (100.4F)  ALBUTerol/ipratropium for Nebulization 3 milliLiter(s) Nebulizer every 6 hours PRN Shortness of Breath  benzonatate 100 milliGRAM(s) Oral every 8 hours PRN Cough OVERNIGHT EVENTS: Fever of 100.4 and given Tylenol.     SUBJECTIVE: Says that his breathing and pleuritic chest pain is a little bit better than yesterday after the bronch. He had nausea and vomited twice after the bronch but is not having any nausea this AM. He has not had a bowel movement for the last few days and states his stomach hurts.     Vital Signs Last 12 Hrs  T(F): 98.5 (12-12-18 @ 04:12), Max: 100.3 (12-11-18 @ 22:01)  HR: 78 (12-12-18 @ 04:12) (78 - 104)  BP: 120/78 (12-12-18 @ 04:12) (120/78 - 132/83)  BP(mean): 99 (12-11-18 @ 22:01) (99 - 99)  RR: 7 (12-12-18 @ 04:12) (7 - 18)  SpO2: 94% (12-12-18 @ 04:12) (92% - 94%)  I&O's Summary      PHYSICAL EXAM:  Constitutional: NAD, comfortable in bed able to speak in full sentences   HEENT: NC/AT, PERRLA, EOMI, no conjunctival pallor or scleral icterus, MMM  Neck: Supple, no JVD  Respiratory: Normal rate, rhythm, depth, effort. Wheezes and crackles diffusely heard b/l RLL and LLL  Cardiovascular: RRR, normal S1 and S2, no m/r/g.   Gastrointestinal: +BS, soft non distended with mild pain to palpation of the lower abdomen without any guarding or rebound tenderness, no palpable masses   Extremities: wwp; no cyanosis, clubbing or edema.   Vascular: Pulses equal and strong throughout.   Neurological: AAOx3, no CN deficits, strength and sensation intact throughout.   Skin: No gross skin abnormalities or rashes        LABS:                        13.3   7.8   )-----------( 204      ( 12 Dec 2018 06:07 )             38.6     12-12    126<L>  |  96  |  13  ----------------------------<  95  3.9   |  20<L>  |  1.25    Ca    9.0      12 Dec 2018 06:08  Phos  4.2     12-12  Mg     2.2     12-12            RADIOLOGY & ADDITIONAL TESTS:    MEDICATIONS  (STANDING):  azithromycin   Tablet 250 milliGRAM(s) Oral daily  cefTRIAXone   IVPB 1 Gram(s) IV Intermittent every 24 hours  fluconAZOLE   Tablet 200 milliGRAM(s) Oral every 24 hours  lisinopril 5 milliGRAM(s) Oral daily  pantoprazole    Tablet 40 milliGRAM(s) Oral before breakfast  potassium chloride    Tablet ER 20 milliEquivalent(s) Oral once  predniSONE   Tablet 40 milliGRAM(s) Oral every 12 hours  trimethoprim  160 mG/sulfamethoxazole 800 mG 2 Tablet(s) Oral <User Schedule>    MEDICATIONS  (PRN):  acetaminophen   Tablet .. 650 milliGRAM(s) Oral every 6 hours PRN Moderate Pain (4 - 6)  acetaminophen   Tablet .. 650 milliGRAM(s) Oral every 6 hours PRN Temp greater or equal to 38C (100.4F)  ALBUTerol/ipratropium for Nebulization 3 milliLiter(s) Nebulizer every 6 hours PRN Shortness of Breath  benzonatate 100 milliGRAM(s) Oral every 8 hours PRN Cough OVERNIGHT EVENTS: Fever of 100.4 and given Tylenol. Refusing oral medications overnight because it hurts his stomach.     SUBJECTIVE: Says that his breathing and pleuritic chest pain is a little bit better than yesterday after the bronch. He had nausea and vomited twice after the bronch but is not having any nausea this AM. He has not had a bowel movement for the last few days and states his stomach hurts.     Vital Signs Last 12 Hrs  T(F): 98.5 (12-12-18 @ 04:12), Max: 100.3 (12-11-18 @ 22:01)  HR: 78 (12-12-18 @ 04:12) (78 - 104)  BP: 120/78 (12-12-18 @ 04:12) (120/78 - 132/83)  BP(mean): 99 (12-11-18 @ 22:01) (99 - 99)  RR: 7 (12-12-18 @ 04:12) (7 - 18)  SpO2: 94% (12-12-18 @ 04:12) (92% - 94%)  I&O's Summary      PHYSICAL EXAM:  Constitutional: NAD, comfortable in bed able to speak in full sentences   HEENT: NC/AT, PERRLA, EOMI, no conjunctival pallor or scleral icterus, MMM  Neck: Supple, no JVD  Respiratory: Normal rate, rhythm, depth, effort. Wheezes and crackles diffusely heard b/l RLL and LLL  Cardiovascular: RRR, normal S1 and S2, no m/r/g.   Gastrointestinal: +BS, soft non distended with mild pain to palpation of the lower abdomen without any guarding or rebound tenderness, no palpable masses   Extremities: wwp; no cyanosis, clubbing or edema.   Vascular: Pulses equal and strong throughout.   Neurological: AAOx3, no CN deficits, strength and sensation intact throughout.   Skin: No gross skin abnormalities or rashes        LABS:                        13.3   7.8   )-----------( 204      ( 12 Dec 2018 06:07 )             38.6     12-12    126<L>  |  96  |  13  ----------------------------<  95  3.9   |  20<L>  |  1.25    Ca    9.0      12 Dec 2018 06:08  Phos  4.2     12-12  Mg     2.2     12-12            RADIOLOGY & ADDITIONAL TESTS:    MEDICATIONS  (STANDING):  azithromycin   Tablet 250 milliGRAM(s) Oral daily  cefTRIAXone   IVPB 1 Gram(s) IV Intermittent every 24 hours  fluconAZOLE   Tablet 200 milliGRAM(s) Oral every 24 hours  lisinopril 5 milliGRAM(s) Oral daily  pantoprazole    Tablet 40 milliGRAM(s) Oral before breakfast  potassium chloride    Tablet ER 20 milliEquivalent(s) Oral once  predniSONE   Tablet 40 milliGRAM(s) Oral every 12 hours  trimethoprim  160 mG/sulfamethoxazole 800 mG 2 Tablet(s) Oral <User Schedule>    MEDICATIONS  (PRN):  acetaminophen   Tablet .. 650 milliGRAM(s) Oral every 6 hours PRN Moderate Pain (4 - 6)  acetaminophen   Tablet .. 650 milliGRAM(s) Oral every 6 hours PRN Temp greater or equal to 38C (100.4F)  ALBUTerol/ipratropium for Nebulization 3 milliLiter(s) Nebulizer every 6 hours PRN Shortness of Breath  benzonatate 100 milliGRAM(s) Oral every 8 hours PRN Cough OVERNIGHT EVENTS: Fever of 100.4 and given Tylenol. Refusing oral medications overnight because it hurts his stomach.     SUBJECTIVE: Says that his breathing and pleuritic chest pain is a little bit better than yesterday after the bronch. He had nausea and vomited twice after the bronch but is not having any nausea this AM. He has not had a bowel movement for the last few days and states his stomach hurts. Passing gas and requiring pills to be crushed in order for it to be swallowed.     Vital Signs Last 12 Hrs  T(F): 98.5 (12-12-18 @ 04:12), Max: 100.3 (12-11-18 @ 22:01)  HR: 78 (12-12-18 @ 04:12) (78 - 104)  BP: 120/78 (12-12-18 @ 04:12) (120/78 - 132/83)  BP(mean): 99 (12-11-18 @ 22:01) (99 - 99)  RR: 7 (12-12-18 @ 04:12) (7 - 18)  SpO2: 94% (12-12-18 @ 04:12) (92% - 94%)  I&O's Summary      PHYSICAL EXAM:  Constitutional: NAD, comfortable in bed able to speak in full sentences   HEENT: NC/AT, PERRLA, EOMI, no conjunctival pallor or scleral icterus, MMM  Neck: Supple, no JVD  Respiratory: Normal rate, rhythm, depth, effort. Wheezes and crackles diffusely heard b/l RLL and LLL  Cardiovascular: RRR, normal S1 and S2, no m/r/g.   Gastrointestinal: +BS, soft non distended with mild pain to palpation of the lower abdomen without any guarding or rebound tenderness, no palpable masses   Extremities: wwp; no cyanosis, clubbing or edema.   Vascular: Pulses equal and strong throughout.   Neurological: AAOx3, no CN deficits, strength and sensation intact throughout.   Skin: No gross skin abnormalities or rashes        LABS:                        13.3   7.8   )-----------( 204      ( 12 Dec 2018 06:07 )             38.6     12-12    126<L>  |  96  |  13  ----------------------------<  95  3.9   |  20<L>  |  1.25    Ca    9.0      12 Dec 2018 06:08  Phos  4.2     12-12  Mg     2.2     12-12            RADIOLOGY & ADDITIONAL TESTS:    MEDICATIONS  (STANDING):  azithromycin   Tablet 250 milliGRAM(s) Oral daily  cefTRIAXone   IVPB 1 Gram(s) IV Intermittent every 24 hours  fluconAZOLE   Tablet 200 milliGRAM(s) Oral every 24 hours  lisinopril 5 milliGRAM(s) Oral daily  pantoprazole    Tablet 40 milliGRAM(s) Oral before breakfast  potassium chloride    Tablet ER 20 milliEquivalent(s) Oral once  predniSONE   Tablet 40 milliGRAM(s) Oral every 12 hours  trimethoprim  160 mG/sulfamethoxazole 800 mG 2 Tablet(s) Oral <User Schedule>    MEDICATIONS  (PRN):  acetaminophen   Tablet .. 650 milliGRAM(s) Oral every 6 hours PRN Moderate Pain (4 - 6)  acetaminophen   Tablet .. 650 milliGRAM(s) Oral every 6 hours PRN Temp greater or equal to 38C (100.4F)  ALBUTerol/ipratropium for Nebulization 3 milliLiter(s) Nebulizer every 6 hours PRN Shortness of Breath  benzonatate 100 milliGRAM(s) Oral every 8 hours PRN Cough

## 2018-12-12 NOTE — DIETITIAN INITIAL EVALUATION ADULT. - PERTINENT MEDS FT
Tivicay,Prezista,Cobisistat(at home),Zithromax,Diflucan,Bactrim,Ceftriaxone,KCl,Protonix,Tessalon,colace,Prednisone

## 2018-12-12 NOTE — DIETITIAN INITIAL EVALUATION ADULT. - FACTORS AFF FOOD INTAKE
persistent constipation/complaints of "estomago" pain with noted issues of constipation.Eating only 50% at present due to reported lack of appetite/pain

## 2018-12-12 NOTE — PROGRESS NOTE ADULT - PROBLEM SELECTOR PLAN 8
1) PCP Contacted on Admission: (Y/N) --> Name & Phone #: Poncho Yu  2) Date of Contact with PCP: Called multiple times 12/10, unable to make contact  3) PCP Contacted at Discharge: (Y/N, N/A)  4) Summary of Handoff Given to PCP:   5) Post-Discharge Appointment Date and Location: DVT ppx: none; improve score low and patient is ambulating     Opportunistic infection ppx:  CD4 count 45  - will need prophylaxis against MAC with azithromycin 1,200mg once weekly when CAP treatment is done (12/17)  - will need continued prophylaxis against toxo with bactrim - one double strength tablet once daily if PCP is ruled out

## 2018-12-12 NOTE — PROGRESS NOTE ADULT - ASSESSMENT
42M Albanian-speaking w/ HIV/AIDS (VL 33,867, CD4 45) on tivicay, prezista, and cobicistat, PMH HTN & asthma presenting from [facility] with c/o SOB, cough, SHAVER found to have PNA of unclear etiology, CAP vs PCP now s/p bronchoscopy.     Recommend:    HIV/AIDS- CD4 45 w/ viral load 33,867 in the setting of non-adherence to Tivicay, Prezista, Cobicistat since July. Patient reports 100% adherence before he was unable to obtain medications due to travel. Follows with Dr. Poncho Yu from [facility].      THIS NOTE IS IN PROGRESS 42M Macanese-speaking w/ HIV/AIDS (VL 33,867, CD4 45) on tivicay, prezista, and cobicistat, PMH HTN & asthma presenting from [facility] with c/o SOB, cough, SHAVER found to have PNA of unclear etiology, CAP vs PCP now s/p bronchoscopy.     Recommend:    1. HIV/AIDS- CD4 45 w/ viral load 33,867 in the setting of non-adherence to Tivicay, Prezista, Cobicistat since July. Patient reports 100% adherence before he was unable to obtain medications due to travel. Follows with Dr. Poncho Yu from [facility].  - Recognize repeated attempts to contact PMD by both primary team and HIV, unsuccessful  - If cannot obtain collateral or contact PMD would re-start ARV with changes as below given low likelihood of IRIS.   - Start Descovy, Tivicay, Prezista, Cobicistat.   - will need azithromycin 1200 mg qweekly for MAC ppx once CAP tx completed    2. PNA- CAP vs PCP  - will f/u bronch specimen  - agree with Bactrim and prednisone for empiric PCP tx   - agree with CAP tx  - please send serum CMV, cryptococcal Ag.   - quantiferon negative, will f/u blastomycoses, coccidiodes, histoplasma    HIV team will continue to follow.

## 2018-12-12 NOTE — PROGRESS NOTE ADULT - PROBLEM SELECTOR PLAN 1
-CT scan reviewed which demonstrates GGOs predominantly in the LLL. This is most suggestive of pneumonia and less typical for PCP which often more diffuse, however given patients immunocompromised state careful consideration must be given for opportunistic infections. Cocciodio and Histo are also possible given the patients travel history to [place] and [place].  -CT done at [facility] 6 months ago reportedly did not have any significant infiltrates -With chronic symptoms and possibly fleeting or waxing/ waning infiltrates, non-infectious etiologies such as  is also possible, however would need to r/o infection first.   -The patient is s/p Bronchoscopy with BAL which is showing a granulocytic predominance consistent with infection. Cultures and studies still pending.     Recommend:  -s/p Bronchoscopy, Culture still pending. PCP PCR Pending as well as other studies.   -Patient should use incentive spirometry as he has rales at b/l bases which could be due to atelectasis. Patient should also get OOB to chair and work with PT. Please do an ambulatory spO2 and record findings. Patient will need to be evaluated for home O2 before discharge.   -Fever may have just been due to bronchoscopy, it is common to have fever post bronch, agree with current ABX regimen and close monitoring.   -Empiric PCP tx with bactrim and steroids, will send studies from bronch and can tailor therapy from there.   -agree w/ CAP coverage with azithro and rocephin.   -agree w/ MAC ppx with azithro after treatment course fro PNA is completed.   -F/u serologies for fungal infections (coccidio, histo, blastomycosis)  - F/U Sputum cultures (bacterial and fungal).

## 2018-12-12 NOTE — PROGRESS NOTE ADULT - SUBJECTIVE AND OBJECTIVE BOX
Interval Events:  Patient seen and examined at bedside.    MEDICATIONS:  Pulmonary:  ALBUTerol/ipratropium for Nebulization 3 milliLiter(s) Nebulizer every 6 hours PRN  benzonatate 100 milliGRAM(s) Oral every 8 hours PRN    Antimicrobials:  azithromycin  IVPB 250 milliGRAM(s) IV Intermittent every 24 hours  cefTRIAXone   IVPB 1 Gram(s) IV Intermittent every 24 hours  fluconAZOLE IVPB 200 milliGRAM(s) IV Intermittent every 24 hours  trimethoprim / sulfamethoxazole IVPB 500 milliGRAM(s) IV Intermittent every 8 hours    Anticoagulants:    Cardiac:  lisinopril 5 milliGRAM(s) Oral daily    Endocrine:  predniSONE   Tablet 40 milliGRAM(s) Oral every 12 hours    Allergies    No Known Allergies    Intolerances        Vital Signs Last 24 Hrs  T(C): 37 (12 Dec 2018 09:14), Max: 38.6 (11 Dec 2018 19:43)  T(F): 98.6 (12 Dec 2018 09:14), Max: 101.5 (11 Dec 2018 19:43)  HR: 88 (12 Dec 2018 09:14) (78 - 110)  BP: 134/78 (12 Dec 2018 09:14) (120/78 - 146/100)  BP(mean): 99 (11 Dec 2018 22:01) (99 - 99)  RR: 16 (12 Dec 2018 09:14) (7 - 18)  SpO2: 92% (12 Dec 2018 09:14) (92% - 95%)    12-12 @ 07:01  -  12-12 @ 09:45  --------------------------------------------------------  IN: 0 mL / OUT: 600 mL / NET: -600 mL          LABS:      CBC Full  -  ( 12 Dec 2018 06:07 )  WBC Count : 7.8 K/uL  Hemoglobin : 13.3 g/dL  Hematocrit : 38.6 %  Platelet Count - Automated : 204 K/uL  Mean Cell Volume : 76.6 fL  Mean Cell Hemoglobin : 26.4 pg  Mean Cell Hemoglobin Concentration : 34.5 g/dL  Auto Neutrophil # : x  Auto Lymphocyte # : x  Auto Monocyte # : x  Auto Eosinophil # : x  Auto Basophil # : x  Auto Neutrophil % : 67.5 %  Auto Lymphocyte % : 22.1 %  Auto Monocyte % : 8.6 %  Auto Eosinophil % : 1.5 %  Auto Basophil % : 0.3 %    12-12    126<L>  |  96  |  13  ----------------------------<  95  3.9   |  20<L>  |  1.25    Ca    9.0      12 Dec 2018 06:08  Phos  4.2     12-12  Mg     2.2     12-12    TPro  8.3  /  Alb  4.1  /  TBili  0.5  /  DBili  <0.2  /  AST  35  /  ALT  46<H>  /  AlkPhos  63  12-12                      RADIOLOGY & ADDITIONAL STUDIES (The following images were personally reviewed): Interval Events:  Patient seen and examined at bedside. The patient notes that overall he feels slightly better but still feels weak and SOB when his is active. He still notes a dry cough.     MEDICATIONS:  Pulmonary:  ALBUTerol/ipratropium for Nebulization 3 milliLiter(s) Nebulizer every 6 hours PRN  benzonatate 100 milliGRAM(s) Oral every 8 hours PRN    Antimicrobials:  azithromycin  IVPB 250 milliGRAM(s) IV Intermittent every 24 hours  cefTRIAXone   IVPB 1 Gram(s) IV Intermittent every 24 hours  fluconAZOLE IVPB 200 milliGRAM(s) IV Intermittent every 24 hours  trimethoprim / sulfamethoxazole IVPB 500 milliGRAM(s) IV Intermittent every 8 hours    Anticoagulants:    Cardiac:  lisinopril 5 milliGRAM(s) Oral daily    Endocrine:  predniSONE   Tablet 40 milliGRAM(s) Oral every 12 hours    Allergies    No Known Allergies    Intolerances        Vital Signs Last 24 Hrs  T(C): 37 (12 Dec 2018 09:14), Max: 38.6 (11 Dec 2018 19:43)  T(F): 98.6 (12 Dec 2018 09:14), Max: 101.5 (11 Dec 2018 19:43)  HR: 88 (12 Dec 2018 09:14) (78 - 110)  BP: 134/78 (12 Dec 2018 09:14) (120/78 - 146/100)  BP(mean): 99 (11 Dec 2018 22:01) (99 - 99)  RR: 16 (12 Dec 2018 09:14) (7 - 18)  SpO2: 92% (12 Dec 2018 09:14) (92% - 95%)    General: NAD, AAO x3  HEENT: No icterus,. Moist mucous membranes  Neck: No JVD noted. Supple, no meningismus  Cardio: S1, S2 noted, RRR. No murmurs, rubs or gallops  Resp: Slight fine rales at b/l bases, otherwise clear. Good respiratory effort. No adventitious sounds  Abdo: Soft, NT, bowel sounds present. No organomegaly  Extremities: No edema noted. Pulses present b/l  Neuro: AAO x3, grossly normal motor strength.  Lymphnodes: no lymphadenopathy identified.  Skin: Dry, no rashes      12-12 @ 07:01  -  12-12 @ 09:45  --------------------------------------------------------  IN: 0 mL / OUT: 600 mL / NET: -600 mL          LABS:      CBC Full  -  ( 12 Dec 2018 06:07 )  WBC Count : 7.8 K/uL  Hemoglobin : 13.3 g/dL  Hematocrit : 38.6 %  Platelet Count - Automated : 204 K/uL  Mean Cell Volume : 76.6 fL  Mean Cell Hemoglobin : 26.4 pg  Mean Cell Hemoglobin Concentration : 34.5 g/dL  Auto Neutrophil # : x  Auto Lymphocyte # : x  Auto Monocyte # : x  Auto Eosinophil # : x  Auto Basophil # : x  Auto Neutrophil % : 67.5 %  Auto Lymphocyte % : 22.1 %  Auto Monocyte % : 8.6 %  Auto Eosinophil % : 1.5 %  Auto Basophil % : 0.3 %    12-12    126<L>  |  96  |  13  ----------------------------<  95  3.9   |  20<L>  |  1.25    Ca    9.0      12 Dec 2018 06:08  Phos  4.2     12-12  Mg     2.2     12-12    TPro  8.3  /  Alb  4.1  /  TBili  0.5  /  DBili  <0.2  /  AST  35  /  ALT  46<H>  /  AlkPhos  63  12-12                      RADIOLOGY & ADDITIONAL STUDIES (The following images were personally reviewed):

## 2018-12-13 LAB
ANION GAP SERPL CALC-SCNC: 12 MMOL/L — SIGNIFICANT CHANGE UP (ref 5–17)
BASOPHILS NFR BLD AUTO: 0.1 % — SIGNIFICANT CHANGE UP (ref 0–2)
BUN SERPL-MCNC: 15 MG/DL — SIGNIFICANT CHANGE UP (ref 7–23)
CALCIUM SERPL-MCNC: 9.4 MG/DL — SIGNIFICANT CHANGE UP (ref 8.4–10.5)
CHLORIDE SERPL-SCNC: 96 MMOL/L — SIGNIFICANT CHANGE UP (ref 96–108)
CO2 SERPL-SCNC: 19 MMOL/L — LOW (ref 22–31)
CREAT SERPL-MCNC: 1.06 MG/DL — SIGNIFICANT CHANGE UP (ref 0.5–1.3)
CULTURE RESULTS: SIGNIFICANT CHANGE UP
CULTURE RESULTS: SIGNIFICANT CHANGE UP
EOSINOPHIL NFR BLD AUTO: 2.8 % — SIGNIFICANT CHANGE UP (ref 0–6)
GALACTOMANNAN AG SPEC IA-ACNC: <0.5 INDEX — SIGNIFICANT CHANGE UP
GALACTOMANNAN AG SPEC IA-ACNC: <0.5 INDEX — SIGNIFICANT CHANGE UP
GLUCOSE SERPL-MCNC: 118 MG/DL — HIGH (ref 70–99)
HCT VFR BLD CALC: 39.9 % — SIGNIFICANT CHANGE UP (ref 39–50)
HGB BLD-MCNC: 13.9 G/DL — SIGNIFICANT CHANGE UP (ref 13–17)
LYMPHOCYTES # BLD AUTO: 19.4 % — SIGNIFICANT CHANGE UP (ref 13–44)
MAGNESIUM SERPL-MCNC: 2.4 MG/DL — SIGNIFICANT CHANGE UP (ref 1.6–2.6)
MCHC RBC-ENTMCNC: 26.5 PG — LOW (ref 27–34)
MCHC RBC-ENTMCNC: 34.8 G/DL — SIGNIFICANT CHANGE UP (ref 32–36)
MCV RBC AUTO: 76.1 FL — LOW (ref 80–100)
MONOCYTES NFR BLD AUTO: 11.1 % — SIGNIFICANT CHANGE UP (ref 2–14)
NEUTROPHILS NFR BLD AUTO: 66.6 % — SIGNIFICANT CHANGE UP (ref 43–77)
PLATELET # BLD AUTO: 206 K/UL — SIGNIFICANT CHANGE UP (ref 150–400)
POTASSIUM SERPL-MCNC: 4.4 MMOL/L — SIGNIFICANT CHANGE UP (ref 3.5–5.3)
POTASSIUM SERPL-SCNC: 4.4 MMOL/L — SIGNIFICANT CHANGE UP (ref 3.5–5.3)
RBC # BLD: 5.24 M/UL — SIGNIFICANT CHANGE UP (ref 4.2–5.8)
RBC # FLD: 13.6 % — SIGNIFICANT CHANGE UP (ref 10.3–16.9)
SODIUM SERPL-SCNC: 127 MMOL/L — LOW (ref 135–145)
SPECIMEN SOURCE: SIGNIFICANT CHANGE UP
SPECIMEN SOURCE: SIGNIFICANT CHANGE UP
WBC # BLD: 7.1 K/UL — SIGNIFICANT CHANGE UP (ref 3.8–10.5)
WBC # FLD AUTO: 7.1 K/UL — SIGNIFICANT CHANGE UP (ref 3.8–10.5)

## 2018-12-13 PROCEDURE — 99233 SBSQ HOSP IP/OBS HIGH 50: CPT

## 2018-12-13 PROCEDURE — 93970 EXTREMITY STUDY: CPT | Mod: 26

## 2018-12-13 RX ORDER — AZITHROMYCIN 500 MG/1
1 TABLET, FILM COATED ORAL
Qty: 3 | Refills: 0 | OUTPATIENT
Start: 2018-12-13 | End: 2018-12-15

## 2018-12-13 RX ORDER — AZTREONAM 2 G
2 VIAL (EA) INJECTION
Qty: 34 | Refills: 0 | OUTPATIENT
Start: 2018-12-13 | End: 2018-12-29

## 2018-12-13 RX ORDER — FLUCONAZOLE 150 MG/1
1 TABLET ORAL
Qty: 5 | Refills: 0 | OUTPATIENT
Start: 2018-12-13 | End: 2018-12-17

## 2018-12-13 RX ORDER — PANTOPRAZOLE SODIUM 20 MG/1
1 TABLET, DELAYED RELEASE ORAL
Qty: 30 | Refills: 0 | OUTPATIENT
Start: 2018-12-13 | End: 2019-01-11

## 2018-12-13 RX ORDER — CEFPODOXIME PROXETIL 100 MG
1 TABLET ORAL
Qty: 6 | Refills: 0 | OUTPATIENT
Start: 2018-12-13 | End: 2018-12-15

## 2018-12-13 RX ADMIN — Medication 2 TABLET(S): at 06:43

## 2018-12-13 RX ADMIN — PANTOPRAZOLE SODIUM 40 MILLIGRAM(S): 20 TABLET, DELAYED RELEASE ORAL at 06:43

## 2018-12-13 RX ADMIN — Medication 40 MILLIGRAM(S): at 18:53

## 2018-12-13 RX ADMIN — Medication 100 MILLIGRAM(S): at 06:43

## 2018-12-13 RX ADMIN — CEFTRIAXONE 100 GRAM(S): 500 INJECTION, POWDER, FOR SOLUTION INTRAMUSCULAR; INTRAVENOUS at 01:35

## 2018-12-13 RX ADMIN — FLUCONAZOLE 200 MILLIGRAM(S): 150 TABLET ORAL at 10:07

## 2018-12-13 RX ADMIN — AZITHROMYCIN 250 MILLIGRAM(S): 500 TABLET, FILM COATED ORAL at 10:07

## 2018-12-13 RX ADMIN — Medication 100 MILLIGRAM(S): at 13:02

## 2018-12-13 RX ADMIN — LISINOPRIL 5 MILLIGRAM(S): 2.5 TABLET ORAL at 06:43

## 2018-12-13 RX ADMIN — Medication 2 TABLET(S): at 13:02

## 2018-12-13 RX ADMIN — Medication 2 TABLET(S): at 22:29

## 2018-12-13 NOTE — DISCHARGE NOTE ADULT - PLAN OF CARE
to improve infection and improve breathing You were treated with a pneumonia which is an infection of your lungs. You must continue on your medications which include Bactrim, Cefpodoxime and Azithromycin. Bactrim you will take for another 17 days. Cefpodoxime and Azithromycin you will take for another 3 days. It is essential to follow up with your HIV doctor for further management because you will also need to be on prophylactic antibiotics as well since your CD4 count (which is a measurement of your HIV status) is low. to improve breathing You were previously diagnosed with asthma. This is a condition of your airways that may cause some difficulty breathing. Please continue on your current medications and follow up with your PMD for further management. to improve HIV You have a history of HIV and were not taking your medications. Please follow up with your HIV doctor at [facility] to receive  your medications. to improve low sodium levels The amount of sodium in your blood is lower than normal which is believed to be based on multiple factors which includes your antibiotic called Bactrim. Please follow up with your doctor after the total 21 day treatment for further monitoring of your sodium levels in your blood. to improve high blood pressure You were diagnosed with elevated blood pressure prior and on lisinopril. Please continue with this medication and see your primary doctor for further management of this.

## 2018-12-13 NOTE — PROGRESS NOTE ADULT - PROBLEM SELECTOR PLAN 3
vomiting and nausea after bronch done on 12/11  patient passing gas but no bowel movement for the past 2 days   concern for obstruction although less likely and stat abdominal xray ordered which showed stool and no free air   patient tolerating breakfast this AM 12/12 and will continue to monitor   started on bowel regiment for constipation

## 2018-12-13 NOTE — PROGRESS NOTE ADULT - PROBLEM SELECTOR PLAN 3
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 water restriction; TMP-SMX changed to PO (was in D5W); monitor BMP

## 2018-12-13 NOTE — DISCHARGE NOTE ADULT - MEDICATION SUMMARY - MEDICATIONS TO STOP TAKING
I will STOP taking the medications listed below when I get home from the hospital:    Tivicay 50 mg oral tablet  -- 1 tab(s) by mouth once a day    cobicistat 150 mg oral tablet  -- 1 tab(s) by mouth once a day    Prezista 100 mg/mL oral suspension  -- 6 milliliter(s) by mouth 2 times a day

## 2018-12-13 NOTE — PROGRESS NOTE ADULT - SUBJECTIVE AND OBJECTIVE BOX
Patient is a 42y old  Male who presents with a chief complaint of CAP (13 Dec 2018 09:44)      INTERVAL HPI/OVERNIGHT EVENTS:    Pt. seen and examined at 1PM  Pt. reports improving cough  Denies F/C, CP, SOB  +BM    Review of Systems: 12 point review of systems otherwise negative    MEDICATIONS  (STANDING):  azithromycin   Tablet 250 milliGRAM(s) Oral every 24 hours  cefTRIAXone   IVPB 1 Gram(s) IV Intermittent every 24 hours  docusate sodium 100 milliGRAM(s) Oral three times a day  fluconAZOLE   Tablet 200 milliGRAM(s) Oral every 24 hours  lisinopril 5 milliGRAM(s) Oral daily  pantoprazole    Tablet 40 milliGRAM(s) Oral before breakfast  predniSONE   Tablet 40 milliGRAM(s) Oral every 12 hours  senna 2 Tablet(s) Oral at bedtime  trimethoprim  160 mG/sulfamethoxazole 800 mG 2 Tablet(s) Oral <User Schedule>    MEDICATIONS  (PRN):  acetaminophen   Tablet .. 650 milliGRAM(s) Oral every 6 hours PRN Moderate Pain (4 - 6)  acetaminophen   Tablet .. 650 milliGRAM(s) Oral every 6 hours PRN Temp greater or equal to 38C (100.4F)  ALBUTerol/ipratropium for Nebulization 3 milliLiter(s) Nebulizer every 6 hours PRN Shortness of Breath  benzonatate 100 milliGRAM(s) Oral every 8 hours PRN Cough      Allergies    No Known Allergies    Intolerances          Vital Signs Last 24 Hrs  T(C): 36.7 (13 Dec 2018 09:03), Max: 37 (12 Dec 2018 15:53)  T(F): 98.1 (13 Dec 2018 09:03), Max: 98.6 (12 Dec 2018 15:53)  HR: 99 (13 Dec 2018 09:03) (70 - 99)  BP: 137/91 (13 Dec 2018 09:03) (114/76 - 137/91)  BP(mean): --  RR: 95 (13 Dec 2018 11:43) (16 - 95)  SpO2: 97% (13 Dec 2018 09:03) (92% - 97%)  CAPILLARY BLOOD GLUCOSE          12-12 @ 07:01  -  12-13 @ 07:00  --------------------------------------------------------  IN: 0 mL / OUT: 850 mL / NET: -850 mL        Physical Exam:  (at 1PM)  Daily     Daily   General: well-appearing in NAD  HEENT:  MMM  CV:  RRR, no JVD  Lungs:  CTA B/L; normal WOB on RA  Abdomen: soft NT ND  Extremities: no edema B/L LE  Skin:  WWP  Neuro:  AAOx3    LABS:                        13.9   7.1   )-----------( 206      ( 13 Dec 2018 07:00 )             39.9     12-13    127<L>  |  96  |  15  ----------------------------<  118<H>  4.4   |  19<L>  |  1.06    Ca    9.4      13 Dec 2018 06:58  Phos  4.2     12-12  Mg     2.4     12-13    TPro  8.3  /  Alb  4.1  /  TBili  0.5  /  DBili  <0.2  /  AST  35  /  ALT  46<H>  /  AlkPhos  63  12-12      Urinalysis Basic - ( 12 Dec 2018 10:36 )    Color: Yellow / Appearance: Clear / SG: >=1.030 / pH: x  Gluc: x / Ketone: NEGATIVE  / Bili: Negative / Urobili: 0.2 E.U./dL   Blood: x / Protein: NEGATIVE mg/dL / Nitrite: NEGATIVE   Leuk Esterase: NEGATIVE / RBC: x / WBC x   Sq Epi: x / Non Sq Epi: x / Bacteria: x          RADIOLOGY & ADDITIONAL TESTS:    ---------------------------------------------------------------------------  I personally reviewed: [  ]EKG   [  ]CXR    [  ] CT    [  ]Other  ---------------------------------------------------------------------------  PLEASE CHECK WHEN PRESENT:     [  ]Heart Failure     [  ] Acute     [  ] Acute on Chronic     [  ] Chronic  -------------------------------------------------------------------     [  ]Diastolic [HFpEF]     [  ]Systolic [HFrEF]     [  ]Combined [HFpEF & HFrEF]     [  ]Other:  -------------------------------------------------------------------  [  ]SABRINA     [  ]ATN     [  ]Reneal Medullary Necrosis     [  ]Renal Cortical Necrosis     [  ]Other Pathological Lesions:    [  ]CKD 1  [  ]CKD 2  [  ]CKD 3  [  ]CKD 4  [  ]CKD 5  [  ]Other  -------------------------------------------------------------------  [  ]Other/Unspecified:    --------------------------------------------------------------------    Abdominal Nutritional Status  [  ]Malnutrition: See Nutrition Note  [  ]Cachexia  [  ]Other:   [  ]Supplement Ordered:  [  ]Morbid Obesity (BMI >=40]

## 2018-12-13 NOTE — PROGRESS NOTE ADULT - PROBLEM SELECTOR PLAN 9
1) PCP Contacted on Admission: (Y/N) --> Name & Phone #: Poncho Yu  2) Date of Contact with PCP: Called multiple times 12/10, unable to make contact  3) PCP Contacted at Discharge: (Y/N, N/A)  4) Summary of Handoff Given to PCP:   5) Post-Discharge Appointment Date and Location:
1) PCP Contacted on Admission: (Y/N) --> Name & Phone #: Poncho Yu  2) Date of Contact with PCP: Called multiple times 12/10, unable to make contact  3) PCP Contacted at Discharge: (Y/N, N/A)  4) Summary of Handoff Given to PCP:   5) Post-Discharge Appointment Date and Location:

## 2018-12-13 NOTE — PROGRESS NOTE ADULT - SUBJECTIVE AND OBJECTIVE BOX
OVERNIGHT EVENTS: LB     SUBJECTIVE: Patient states he feels better. Had a large bowel movement overnight after bowel regiment. His stomach pain is improved. No n/v or shortness of breath. He is coughing still but able to speak in full sentences.     Vital Signs Last 12 Hrs  T(F): 97.6 (12-13-18 @ 05:05), Max: 97.7 (12-12-18 @ 21:08)  HR: 85 (12-13-18 @ 05:05) (70 - 85)  BP: 134/93 (12-13-18 @ 05:05) (118/77 - 134/93)  RR: 18 (12-13-18 @ 05:05) (17 - 18)  SpO2: 92% (12-13-18 @ 05:05) (92% - 96%)  I&O's Summary    12 Dec 2018 07:01  -  13 Dec 2018 07:00  --------------------------------------------------------  IN: 0 mL / OUT: 850 mL / NET: -850 mL        PHYSICAL EXAM:  Constitutional: NAD, comfortable in bed.  HEENT: NC/AT, PERRLA, EOMI, no conjunctival pallor or scleral icterus, MMM  Neck: Supple, no JVD  Respiratory: Normal rate, rhythm, depth, effort. CTAB. No w/r/r.   Cardiovascular: RRR, normal S1 and S2, no m/r/g.   Gastrointestinal: +BS, soft NTND, no guarding or rebound tenderness, no palpable masses   Extremities: wwp; no cyanosis, clubbing or edema.   Vascular: Pulses equal and strong throughout.   Neurological: AAOx3, no CN deficits, strength and sensation intact throughout.   Skin: No gross skin abnormalities or rashes        LABS:                        13.9   7.1   )-----------( 206      ( 13 Dec 2018 07:00 )             39.9     12-13    127<L>  |  96  |  15  ----------------------------<  118<H>  4.4   |  19<L>  |  1.06    Ca    9.4      13 Dec 2018 06:58  Phos  4.2     12-12  Mg     2.4     12-13    TPro  8.3  /  Alb  4.1  /  TBili  0.5  /  DBili  <0.2  /  AST  35  /  ALT  46<H>  /  AlkPhos  63  12-12      Urinalysis Basic - ( 12 Dec 2018 10:36 )    Color: Yellow / Appearance: Clear / SG: >=1.030 / pH: x  Gluc: x / Ketone: NEGATIVE  / Bili: Negative / Urobili: 0.2 E.U./dL   Blood: x / Protein: NEGATIVE mg/dL / Nitrite: NEGATIVE   Leuk Esterase: NEGATIVE / RBC: x / WBC x   Sq Epi: x / Non Sq Epi: x / Bacteria: x        RADIOLOGY & ADDITIONAL TESTS:    MEDICATIONS  (STANDING):  azithromycin   Tablet 250 milliGRAM(s) Oral every 24 hours  cefTRIAXone   IVPB 1 Gram(s) IV Intermittent every 24 hours  docusate sodium 100 milliGRAM(s) Oral three times a day  fluconAZOLE   Tablet 200 milliGRAM(s) Oral every 24 hours  lisinopril 5 milliGRAM(s) Oral daily  pantoprazole    Tablet 40 milliGRAM(s) Oral before breakfast  predniSONE   Tablet 40 milliGRAM(s) Oral every 12 hours  senna 2 Tablet(s) Oral at bedtime  trimethoprim  160 mG/sulfamethoxazole 800 mG 2 Tablet(s) Oral <User Schedule>    MEDICATIONS  (PRN):  acetaminophen   Tablet .. 650 milliGRAM(s) Oral every 6 hours PRN Moderate Pain (4 - 6)  acetaminophen   Tablet .. 650 milliGRAM(s) Oral every 6 hours PRN Temp greater or equal to 38C (100.4F)  ALBUTerol/ipratropium for Nebulization 3 milliLiter(s) Nebulizer every 6 hours PRN Shortness of Breath  benzonatate 100 milliGRAM(s) Oral every 8 hours PRN Cough

## 2018-12-13 NOTE — PROGRESS NOTE ADULT - ASSESSMENT
42M heterosexual with PMHx HIV (unknown VL or CD4, on tivicay, prezista, and cobisistat), [facility]/o progressive SOB, cough, SHAVER x3-4 weeks likely 2/2 CAP with CT chest showing LLL consolidation.

## 2018-12-13 NOTE — DISCHARGE NOTE ADULT - MEDICATION SUMMARY - MEDICATIONS TO TAKE
I will START or STAY ON the medications listed below when I get home from the hospital:    predniSONE 20 mg oral tablet  -- 2 tab(s) by mouth every 12 hours   40mg prednisone twice/day for 2 days  40 mg prednisone daily for 5 days  20 mg prednisone daily for 5 days  -- It is very important that you take or use this exactly as directed.  Do not skip doses or discontinue unless directed by your doctor.  Obtain medical advice before taking any non-prescription drugs as some may affect the action of this medication.  Take with food or milk.    -- Indication: For PNEUMONIA    predniSONE 20 mg oral tablet  -- 1 tab(s) by mouth once a day   40mg prednisone twice/day for 2 days  40 mg prednisone daily for 5 days  20 mg prednisone daily for 5 days  -- It is very important that you take or use this exactly as directed.  Do not skip doses or discontinue unless directed by your doctor.  Obtain medical advice before taking any non-prescription drugs as some may affect the action of this medication.  Take with food or milk.    -- Indication: For PNEUMONIA    predniSONE 20 mg oral tablet  -- 2 tab(s) by mouth once a day   40mg prednisone twice/day for 2 days  40 mg prednisone daily for 5 days  20 mg prednisone daily for 5 days  -- It is very important that you take or use this exactly as directed.  Do not skip doses or discontinue unless directed by your doctor.  Obtain medical advice before taking any non-prescription drugs as some may affect the action of this medication.  Take with food or milk.    -- Indication: For PNEUMONIA    lisinopril 5 mg oral tablet  -- 1 tab(s) by mouth once a day  -- Indication: For HTN (hypertension)    fluconazole 200 mg oral tablet  -- 1 tab(s) by mouth every 24 hours  -- Indication: For Oral Thrush     cefpodoxime 200 mg oral tablet  -- 1 tab(s) by mouth every 12 hours   -- Finish all this medication unless otherwise directed by prescriber.  Take with food or milk.    -- Indication: For CAP (community acquired pneumonia)    Zithromax 250 mg oral tablet  -- 1 tab(s) by mouth every 24 hours  -- Indication: For CAP (community acquired pneumonia)    Protonix 40 mg oral delayed release tablet  -- 1 tab(s) by mouth once a day (before a meal)  -- Indication: For Prophylactic measure    Bactrim  mg-160 mg oral tablet  -- 2 tab(s) by mouth once a day   -- Indication: For PNEUMONIA I will START or STAY ON the medications listed below when I get home from the hospital:    predniSONE 20 mg oral tablet  -- 2 tab(s) by mouth every 12 hours   40mg prednisone twice/day for 2 days  40 mg prednisone daily for 5 days  20 mg prednisone daily for 5 days  -- It is very important that you take or use this exactly as directed.  Do not skip doses or discontinue unless directed by your doctor.  Obtain medical advice before taking any non-prescription drugs as some may affect the action of this medication.  Take with food or milk.    -- Indication: For PNEUMONIA    predniSONE 20 mg oral tablet  -- 1 tab(s) by mouth once a day   40mg prednisone twice/day for 2 days  40 mg prednisone daily for 5 days  20 mg prednisone daily for 5 days  -- It is very important that you take or use this exactly as directed.  Do not skip doses or discontinue unless directed by your doctor.  Obtain medical advice before taking any non-prescription drugs as some may affect the action of this medication.  Take with food or milk.    -- Indication: For PNEUMONIA    predniSONE 20 mg oral tablet  -- 2 tab(s) by mouth once a day   40mg prednisone twice/day for 2 days  40 mg prednisone daily for 5 days  20 mg prednisone daily for 5 days  -- It is very important that you take or use this exactly as directed.  Do not skip doses or discontinue unless directed by your doctor.  Obtain medical advice before taking any non-prescription drugs as some may affect the action of this medication.  Take with food or milk.    -- Indication: For PNEUMONIA    lisinopril 5 mg oral tablet  -- 1 tab(s) by mouth once a day  -- Indication: For HTN (hypertension)    fluconazole 200 mg oral tablet  -- 1 tab(s) by mouth every 24 hours  -- Indication: For Oral Thrush     cefpodoxime 200 mg oral tablet  -- 1 tab(s) by mouth every 12 hours   -- Finish all this medication unless otherwise directed by prescriber.  Take with food or milk.    -- Indication: For CAP (community acquired pneumonia)    Zithromax 250 mg oral tablet  -- 1 tab(s) by mouth every 24 hours  -- Indication: For CAP (community acquired pneumonia)    azithromycin 600 mg oral tablet  -- 2 tab(s) by mouth every 7 days   1200mg weekly for MAC (HIV related infection) prophylaxis   -- Do not take dairy products, antacids, or iron preparations within one hour of this medication.  Finish all this medication unless otherwise directed by prescriber.    -- Indication: For Prophylactic measure against MAC    Protonix 40 mg oral delayed release tablet  -- 1 tab(s) by mouth once a day (before a meal)  -- Indication: For Prophylactic measure    Bactrim  mg-160 mg oral tablet  -- 2 tab(s) by mouth once a day   -- Indication: For PNEUMONIA

## 2018-12-13 NOTE — PROGRESS NOTE ADULT - PROBLEM SELECTOR PLAN 2
On Tivicay 50mg qd, Prezista 600 mg BID, cobisistat 150 mg qd. Follows with [facility]. Unknown last VL or CD4. Pt reporting not on ppx medications. Has missed x3 weeks of medications 2/2 running out of Rx.  -holding HAART until PCP is ruled out  -CD4 45  -VL 33,000  -HCV negative  - Quantiferon negative   - f/u HIV recs

## 2018-12-13 NOTE — DISCHARGE NOTE ADULT - PATIENT PORTAL LINK FT
You can access the [facility] Patient Portal, offered by [facility], by registering with the following website: [email or web address]

## 2018-12-13 NOTE — PROGRESS NOTE ADULT - PROBLEM SELECTOR PLAN 1
Pt presenting with cough productive yellow sputum from previously white, SOB, and SHAVER. CT chest showing LLL consolidation vs. atelectasis. Pt with h/o HIV (unknown VL or CD4 on admission). Repeat ABG showing pO2 of 75. Pt in no respiratory distress on exam with scattered wheezing/rhonchi. No fever, leukocytosis.  -RVP undetected   -c/w CTX/azith for total of 7 day course (ends 12/17)  -duonebs PRN  -tylenol PRN fever and pleuritic chest pain   -Blood cx NGTD  -h/o HIV is concerning for possible PCP though pt grossly nontoxic and CT chest showing lobar consolidation more suggestive of CAP; A-a gradient <35 and pO2 >70 less concerning for PCP but will continue treatment with prednisone and bactrim  -LDH mildly elevated  - bronch results gram negative diplococci, moderate gram positive in pairs chains and clusters and moderate WBC Pt presenting with cough productive yellow sputum from previously white, SOB, and SHAVER. CT chest showing LLL consolidation vs. atelectasis. Pt with h/o HIV (unknown VL or CD4 on admission). Repeat ABG showing pO2 of 75. Pt in no respiratory distress on exam with scattered wheezing/rhonchi. No fever, leukocytosis.  -RVP undetected   -c/w CTX/azith for total of 7 day course (ends 12/17)  -duonebs PRN  -tylenol PRN fever and pleuritic chest pain   -Blood cx NGTD  -h/o HIV is concerning for possible PCP though pt grossly nontoxic and CT chest showing lobar consolidation more suggestive of CAP; A-a gradient <35 and pO2 >70 less concerning for PCP but will continue treatment with prednisone and bactrim  -LDH mildly elevated  - bronch results gram negative diplococci, moderate gram positive in pairs chains and clusters and moderate WBC  -Ambulated for several minutes with pulse ox 95% on room air

## 2018-12-13 NOTE — PROGRESS NOTE ADULT - PROBLEM SELECTOR PLAN 5
Na 131 on admission, 127 today. Unclear etiology. Could be SIADH, idiopathic vs 2/2 PCP vs Legionella   - patient mentating well   - urine sodium 126 with low serum osm at 268 and urine osmolality 599 (high) which is more consistent with SIADH   - urine legionella negative    - trend BMP  - free water restriction

## 2018-12-13 NOTE — PROGRESS NOTE ADULT - SUBJECTIVE AND OBJECTIVE BOX
Interval Events:  Patient seen and examined at bedside. The patient notes that his cough has diminshed and continues to be dry. He notes that he got up and walked yesterday without feeling SOB. The patient denies any fevers overnight and notes that he feels "80%" of his baseline.     MEDICATIONS:  Pulmonary:  ALBUTerol/ipratropium for Nebulization 3 milliLiter(s) Nebulizer every 6 hours PRN  benzonatate 100 milliGRAM(s) Oral every 8 hours PRN    Antimicrobials:  azithromycin   Tablet 250 milliGRAM(s) Oral every 24 hours  cefTRIAXone   IVPB 1 Gram(s) IV Intermittent every 24 hours  fluconAZOLE   Tablet 200 milliGRAM(s) Oral every 24 hours  trimethoprim  160 mG/sulfamethoxazole 800 mG 2 Tablet(s) Oral <User Schedule>    Anticoagulants:    Cardiac:  lisinopril 5 milliGRAM(s) Oral daily    Endocrine:  predniSONE   Tablet 40 milliGRAM(s) Oral every 12 hours    Allergies    No Known Allergies    Intolerances        Vital Signs Last 24 Hrs  T(C): 36.7 (13 Dec 2018 09:03), Max: 37 (12 Dec 2018 15:53)  T(F): 98.1 (13 Dec 2018 09:03), Max: 98.6 (12 Dec 2018 15:53)  HR: 99 (13 Dec 2018 09:03) (70 - 99)  BP: 137/91 (13 Dec 2018 09:03) (114/76 - 137/91)  BP(mean): --  RR: 18 (13 Dec 2018 09:03) (16 - 18)  SpO2: 97% (13 Dec 2018 09:03) (92% - 97%)    General: NAD, AAO x3  HEENT: No icterus,. Moist mucous membranes  Neck: No JVD noted. Supple, no meningismus  Cardio: S1, S2 noted, RRR. No murmurs, rubs or gallops  Resp: Clear to auscultation b/l. No wheezes, rales, or rhonchi noted. Good respiratory effort.   Abdo: Soft, NT, bowel sounds present. No organomegaly  Extremities: No edema noted. Pulses present b/l  Neuro: AAO x3, grossly normal motor strength.  Lymphnodes: no lymphadenopathy identified.  Skin: Dry, no rashes    12-12 @ 07:01  -  12-13 @ 07:00  --------------------------------------------------------  IN: 0 mL / OUT: 850 mL / NET: -850 mL    CBC Full  -  ( 13 Dec 2018 07:00 )  WBC Count : 7.1 K/uL  Hemoglobin : 13.9 g/dL  Hematocrit : 39.9 %  Platelet Count - Automated : 206 K/uL  Mean Cell Volume : 76.1 fL  Mean Cell Hemoglobin : 26.5 pg  Mean Cell Hemoglobin Concentration : 34.8 g/dL  Auto Neutrophil # : x  Auto Lymphocyte # : x  Auto Monocyte # : x  Auto Eosinophil # : x  Auto Basophil # : x  Auto Neutrophil % : 66.6 %  Auto Lymphocyte % : 19.4 %  Auto Monocyte % : 11.1 %  Auto Eosinophil % : 2.8 %  Auto Basophil % : 0.1 %    12-13    127<L>  |  96  |  15  ----------------------------<  118<H>  4.4   |  19<L>  |  1.06    Ca    9.4      13 Dec 2018 06:58  Phos  4.2     12-12  Mg     2.4     12-13    TPro  8.3  /  Alb  4.1  /  TBili  0.5  /  DBili  <0.2  /  AST  35  /  ALT  46<H>  /  AlkPhos  63  12-12          Urinalysis Basic - ( 12 Dec 2018 10:36 )    Color: Yellow / Appearance: Clear / SG: >=1.030 / pH: x  Gluc: x / Ketone: NEGATIVE  / Bili: Negative / Urobili: 0.2 E.U./dL   Blood: x / Protein: NEGATIVE mg/dL / Nitrite: NEGATIVE   Leuk Esterase: NEGATIVE / RBC: x / WBC x   Sq Epi: x / Non Sq Epi: x / Bacteria: x                RADIOLOGY & ADDITIONAL STUDIES (The following images were personally reviewed):

## 2018-12-13 NOTE — PROGRESS NOTE ADULT - PROBLEM SELECTOR PLAN 8
DVT ppx: none; improve score low and patient is ambulating     Opportunistic infection ppx:  CD4 count 45  - will need prophylaxis against MAC with azithromycin 1,200mg once weekly when CAP treatment is done (12/17)  - will need continued prophylaxis against toxo with bactrim - one double strength tablet once daily if PCP is ruled out

## 2018-12-13 NOTE — DISCHARGE NOTE ADULT - CARE PLAN
Principal Discharge DX:	CAP (community acquired pneumonia)  Goal:	to improve infection and improve breathing  Assessment and plan of treatment:	You were treated with a pneumonia which is an infection of your lungs. You must continue on your medications which include Bactrim, Cefpodoxime and Azithromycin. Bactrim you will take for another 17 days. Cefpodoxime and Azithromycin you will take for another 3 days. It is essential to follow up with your HIV doctor for further management because you will also need to be on prophylactic antibiotics as well since your CD4 count (which is a measurement of your HIV status) is low.  Secondary Diagnosis:	Asthma  Goal:	to improve breathing  Assessment and plan of treatment:	You were previously diagnosed with asthma. This is a condition of your airways that may cause some difficulty breathing. Please continue on your current medications and follow up with your PMD for further management.  Secondary Diagnosis:	HIV (human immunodeficiency virus infection)  Goal:	to improve HIV  Assessment and plan of treatment:	You have a history of HIV and were not taking your medications. Please follow up with your HIV doctor at [facility] to receive  your medications.  Secondary Diagnosis:	Hyponatremia  Goal:	to improve low sodium levels  Assessment and plan of treatment:	The amount of sodium in your blood is lower than normal which is believed to be based on multiple factors which includes your antibiotic called Bactrim. Please follow up with your doctor after the total 21 day treatment for further monitoring of your sodium levels in your blood.  Secondary Diagnosis:	Essential hypertension  Goal:	to improve high blood pressure  Assessment and plan of treatment:	You were diagnosed with elevated blood pressure prior and on lisinopril. Please continue with this medication and see your primary doctor for further management of this.

## 2018-12-13 NOTE — PROGRESS NOTE ADULT - PROBLEM SELECTOR PLAN 1
-CT scan reviewed which demonstrates GGOs predominantly in the LLL. This is most suggestive of pneumonia.   -The patient is s/p Bronchoscopy with BAL which is showing a granulocytic predominance consistent with infection. Cultures growing few yeasts, and few gram positive and gram negative organisms.   -Blastomyces antigen in serum negative. Fungitell negative. PCP PCR pending.   -Clinically the patient is continuing to improve.     Recommend:  -s/p Bronchoscopy, Culture so far not particularly yielding, Few yeasts as well as rare gram + and gram - organisms.   -PCP PCR Pending as well as other studies.   -Patient should use incentive spirometry, get OOB and ambulate and work with PT. -Please do an ambulatory spO2 and record findings. Patient will need to be evaluated for home O2 before discharge.   -Fever did not recur and likely was due to bronchoscopy.  -Empiric PCP tx with bactrim and steroids, F/U studies from bronch and can tailor therapy from there.   -agree w/ CAP coverage with azithro and rocephin.   -agree w/ MAC ppx with azithro after treatment course fro PNA is completed.   -F/u serologies for fungal infections (coccidio, histo), serum blastomyces antigen negative.   - F/U Sputum cultures (bacterial and fungal). -CT scan reviewed which demonstrates GGOs predominantly in the LLL. This is most suggestive of pneumonia.   -The patient is s/p Bronchoscopy with BAL which is showing a granulocytic predominance consistent with infection. Cultures growing few yeasts, and few gram positive and gram negative organisms.   -Blastomyces antigen in serum negative. Fungitell negative. PCP PCR pending.   -Clinically the patient is continuing to improve.     Recommend:  -s/p Bronchoscopy, Culture so far not particularly yielding, Few yeasts as well as rare gram + and gram - organisms.   -PCP PCR Pending as well as other studies. At this point if PCP PCR is negative, it would not rule out PCP and thus patient should complete a treatment course for PCP including steroids.    -Would also complete a treatment course for CAP.   -Patient should use incentive spirometry, get OOB and ambulate and work with PT. -Please do an ambulatory spO2 and record findings. Patient will need to be evaluated for home O2 before discharge.   -Fever did not recur and likely was due to bronchoscopy.  -agree w/ MAC ppx with azithro after treatment course fro PNA is completed.   -F/u serologies for fungal infections (coccidio, histo), serum blastomyces antigen negative. -CT scan reviewed which demonstrates GGOs predominantly in the LLL. This is most suggestive of pneumonia.   -The patient is s/p Bronchoscopy with BAL which is showing a granulocytic predominance consistent with infection. Cultures growing few yeasts, and few gram positive and gram negative organisms.   -Blastomyces antigen in serum negative. Fungitell negative. PCP PCR pending.   -Clinically the patient is continuing to improve.     Recommend:  -s/p Bronchoscopy, Culture so far not particularly yielding, Few yeasts as well as rare gram + and gram - organisms.   -PCP PCR Pending as well as other studies. At this point if PCP PCR is negative, it would not rule out PCP and thus patient should complete a treatment course for PCP including steroids. Steroid regimen (40mg PO BID x5 days, 40mg PO Daily X 5 days, 20mg PO Daily for 11 days), patient should be switched to 40mg daily tomorrow as today is day 5 of steroids.    -Would also complete a treatment course for CAP.   -Patient should use incentive spirometry, get OOB and ambulate and work with PT. -Please do an ambulatory spO2 and record findings. Patient will need to be evaluated for home O2 before discharge.   -Fever did not recur and likely was due to bronchoscopy.  -agree w/ MAC ppx with azithro after treatment course fro PNA is completed.   -F/u serologies for fungal infections (coccidio, histo), serum blastomyces antigen negative.

## 2018-12-13 NOTE — DISCHARGE NOTE ADULT - HOSPITAL COURSE
This is a 42M with PMH HTN, asthma, HIV with low CD4 count and high viral load who p/w cough and SHAVER found to have CAP vs PCP PNA s/p bronchoscopy which showed most likely bacterial in origin but will continue on prednisone and Bactrim treatment for PCP since he was started on treatment prior to the bronch. 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 Patient water restricted and given medications orally instead of through IV in order to improve sodium levels but remained between 126-130 this admission. Throughout his hyponatremia, he mentated well and had no complaints. Patient discharged on cef/azithro, bactrim and steroids to complete treatment for CAP and PCP and will need prophylactic antibiotics for MAC since his CD4 count is <50. Patient ambulating well and saturating well on RA and passed walking desat and stable for discharge home with close follow up with HIV doctor. Made multiple attempts this admission to contact his physician but he is going home with HIV/AIDS services who will set him up with the appropriate physician in case he fails follow up as an outpatient. This is a 42M with PMH HTN, asthma, HIV with low CD4 count (failing to take antiretrovirals since ran out of prescription at home) who p/w cough and SHAVER found to have CAP vs PCP PNA s/p bronchoscopy which showed most likely bacterial in origin but will continue on prednisone and Bactrim treatment for PCP since he was started on treatment prior to the bronch. 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 Patient water restricted and given medications orally instead of through IV in order to improve sodium levels but remained between 126-130 this admission. Throughout his hyponatremia, he mentated well and had no complaints. Patient discharged on cef/azithro, bactrim and steroids to complete treatment for CAP and PCP and also discharged on 1200mg weekly of azithromycin to cover for MAC prophylaxis since his CD4 count this admission was <50. Discussed with patient he will need further prophylactic medications to prevent from other HIV related infections such as toxoplasmosis that he will need to receive from his HIV doctor. Since patient has acute infection he will not be discharged on previous antiretrovirals and discussed with patient the importance of getting a prescription from his outpatient doctor after completing antibiotics. Patient ambulating well and saturating well on RA and passed walking desat and stable for discharge home with close follow up with HIV doctor. Made multiple attempts this admission to contact his physician but the patient did not remember the name or telephone number. He is going home with HIV/AIDS services who will set him up with the appropriate physician in case he fails follow up as an outpatient. This is a 42M with PMH HTN, asthma, HIV with low CD4 count (failing to take antiretrovirals since ran out of prescription at home) who p/w cough and SHAVER found to have CAP vs PCP PNA s/p bronchoscopy which showed most likely bacterial in origin but will continue on prednisone and Bactrim treatment for PCP since he was started on treatment prior to the bronch. 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 Patient water restricted and given medications orally instead of through IV in order to improve sodium levels but remained between 126-130 this admission. Throughout his hyponatremia, he mentated well and had no complaints. Patient discharged on cef/azithro, bactrim and steroids to complete treatment for CAP and PCP and also discharged on 1200mg weekly of azithromycin to cover for MAC prophylaxis since his CD4 count this admission was <50. Discussed with patient via phone  ID# [id number] that he will need further prophylactic medications to prevent from other HIV related infections such as toxoplasmosis that he will need to receive from his HIV doctor. Since patient has acute infection he will not be discharged on previous antiretrovirals and discussed with patient the importance of getting a prescription from his outpatient doctor after completing antibiotics. Patient ambulating well and saturating well on RA and passed walking desat and stable for discharge home with close follow up with HIV doctor. Made multiple attempts this admission to contact his physician but the patient did not remember the name or telephone number. He is going home with HIV/AIDS services who will set him up with the appropriate physician in case he fails follow up as an outpatient.

## 2018-12-13 NOTE — DISCHARGE NOTE ADULT - ADDITIONAL INSTRUCTIONS
Please follow up with HIV doctor, Dr. Poncho Yu for further management. Please follow up with HIV doctor, Dr. Poncho Yu for further management. You need to return to him to receive your HIV medications because I did not refill these medications since you will not be discharged with them. Please follow up with HIV doctor, Dr. Poncho Yu for further management. You need to return to him to receive your HIV medications because I did not refill these medications since you will not be discharged with them since you still have an acute infection.   Since your CD4 count is at 45 which is below 50 you will need certain prophylactic medications to prevent you from further infection. You need prophylaxis against MAC with azithromycin 1,200mg once weekly which will be prescribed for you. You will also need prophylaxis against  toxoplasmosis which is another type of infection which will not be prescribed at this time since you are already on bactrim. Please discuss this with your HIV doctor.

## 2018-12-14 VITALS
DIASTOLIC BLOOD PRESSURE: 98 MMHG | OXYGEN SATURATION: 98 % | TEMPERATURE: 98 F | SYSTOLIC BLOOD PRESSURE: 145 MMHG | HEART RATE: 84 BPM | RESPIRATION RATE: 18 BRPM

## 2018-12-14 LAB
ANION GAP SERPL CALC-SCNC: 15 MMOL/L — SIGNIFICANT CHANGE UP (ref 5–17)
BUN SERPL-MCNC: 16 MG/DL — SIGNIFICANT CHANGE UP (ref 7–23)
CALCIUM SERPL-MCNC: 9.3 MG/DL — SIGNIFICANT CHANGE UP (ref 8.4–10.5)
CHLORIDE SERPL-SCNC: 91 MMOL/L — LOW (ref 96–108)
CO2 SERPL-SCNC: 20 MMOL/L — LOW (ref 22–31)
CREAT SERPL-MCNC: 0.87 MG/DL — SIGNIFICANT CHANGE UP (ref 0.5–1.3)
CULTURE RESULTS: SIGNIFICANT CHANGE UP
CULTURE RESULTS: SIGNIFICANT CHANGE UP
FUNGITELL B-D-GLUCAN,  BRONCHIAL LAVAGE: SIGNIFICANT CHANGE UP
FUNGITELL B-D-GLUCAN,  BRONCHIAL WASH: SIGNIFICANT CHANGE UP
GLUCOSE SERPL-MCNC: 108 MG/DL — HIGH (ref 70–99)
HCT VFR BLD CALC: 39.9 % — SIGNIFICANT CHANGE UP (ref 39–50)
HGB BLD-MCNC: 13.6 G/DL — SIGNIFICANT CHANGE UP (ref 13–17)
MAGNESIUM SERPL-MCNC: 2.4 MG/DL — SIGNIFICANT CHANGE UP (ref 1.6–2.6)
MCHC RBC-ENTMCNC: 26 PG — LOW (ref 27–34)
MCHC RBC-ENTMCNC: 34.1 G/DL — SIGNIFICANT CHANGE UP (ref 32–36)
MCV RBC AUTO: 76.1 FL — LOW (ref 80–100)
PLATELET # BLD AUTO: 238 K/UL — SIGNIFICANT CHANGE UP (ref 150–400)
POTASSIUM SERPL-MCNC: 4.4 MMOL/L — SIGNIFICANT CHANGE UP (ref 3.5–5.3)
POTASSIUM SERPL-SCNC: 4.4 MMOL/L — SIGNIFICANT CHANGE UP (ref 3.5–5.3)
RBC # BLD: 5.24 M/UL — SIGNIFICANT CHANGE UP (ref 4.2–5.8)
RBC # FLD: 13.8 % — SIGNIFICANT CHANGE UP (ref 10.3–16.9)
SODIUM SERPL-SCNC: 126 MMOL/L — LOW (ref 135–145)
SPECIMEN SOURCE: SIGNIFICANT CHANGE UP
SPECIMEN SOURCE: SIGNIFICANT CHANGE UP
WBC # BLD: 6.1 K/UL — SIGNIFICANT CHANGE UP (ref 3.8–10.5)
WBC # FLD AUTO: 6.1 K/UL — SIGNIFICANT CHANGE UP (ref 3.8–10.5)

## 2018-12-14 PROCEDURE — 99238 HOSP IP/OBS DSCHRG MGMT 30/<: CPT

## 2018-12-14 RX ORDER — CEFPODOXIME PROXETIL 100 MG
1 TABLET ORAL
Qty: 6 | Refills: 0 | OUTPATIENT
Start: 2018-12-14 | End: 2018-12-16

## 2018-12-14 RX ORDER — PANTOPRAZOLE SODIUM 20 MG/1
1 TABLET, DELAYED RELEASE ORAL
Qty: 30 | Refills: 0 | OUTPATIENT
Start: 2018-12-14 | End: 2019-01-12

## 2018-12-14 RX ORDER — AZTREONAM 2 G
2 VIAL (EA) INJECTION
Qty: 34 | Refills: 0 | OUTPATIENT
Start: 2018-12-14 | End: 2018-12-30

## 2018-12-14 RX ORDER — COBICISTAT 150 MG/1
1 TABLET, FILM COATED ORAL
Qty: 0 | Refills: 0 | COMMUNITY

## 2018-12-14 RX ORDER — FLUCONAZOLE 150 MG/1
1 TABLET ORAL
Qty: 5 | Refills: 0 | OUTPATIENT
Start: 2018-12-14 | End: 2018-12-18

## 2018-12-14 RX ORDER — DARUNAVIR 75 MG/1
8 TABLET, FILM COATED ORAL
Qty: 0 | Refills: 0 | COMMUNITY

## 2018-12-14 RX ORDER — DOLUTEGRAVIR SODIUM 25 MG/1
1 TABLET, FILM COATED ORAL
Qty: 0 | Refills: 0 | COMMUNITY

## 2018-12-14 RX ORDER — AZITHROMYCIN 500 MG/1
1 TABLET, FILM COATED ORAL
Qty: 3 | Refills: 0 | OUTPATIENT
Start: 2018-12-14 | End: 2018-12-16

## 2018-12-14 RX ADMIN — Medication 2 TABLET(S): at 05:55

## 2018-12-14 RX ADMIN — LISINOPRIL 5 MILLIGRAM(S): 2.5 TABLET ORAL at 05:56

## 2018-12-14 RX ADMIN — Medication 40 MILLIGRAM(S): at 05:56

## 2018-12-14 RX ADMIN — CEFTRIAXONE 100 GRAM(S): 500 INJECTION, POWDER, FOR SOLUTION INTRAMUSCULAR; INTRAVENOUS at 05:00

## 2018-12-14 RX ADMIN — PANTOPRAZOLE SODIUM 40 MILLIGRAM(S): 20 TABLET, DELAYED RELEASE ORAL at 06:01

## 2018-12-14 RX ADMIN — Medication 100 MILLIGRAM(S): at 05:55

## 2018-12-14 RX ADMIN — FLUCONAZOLE 200 MILLIGRAM(S): 150 TABLET ORAL at 09:56

## 2018-12-14 RX ADMIN — AZITHROMYCIN 250 MILLIGRAM(S): 500 TABLET, FILM COATED ORAL at 09:56

## 2018-12-14 NOTE — PROGRESS NOTE ADULT - PROBLEM SELECTOR PROBLEM 2
AIDS
HIV (human immunodeficiency virus infection)

## 2018-12-14 NOTE — PROGRESS NOTE ADULT - PROBLEM SELECTOR PLAN 2
HIV team following; holding ARVs for now, per recs -- can be restarted as outpatient; on fluconazole for thrush

## 2018-12-14 NOTE — PROGRESS NOTE ADULT - PROBLEM SELECTOR PLAN 3
hypo-osmolar, euvolemic; suspected dx SIADH, d/t underlying PNA (urine lytes support dx); cont. water restriction, tx underlyn PNA; TMP-SMX changed to PO (was in D5W); monitor BMP; liberalize salt intake

## 2018-12-14 NOTE — PROGRESS NOTE ADULT - ATTENDING COMMENTS
AIDS with CD4 count 45 with respiratory symptoms. Left lower consolation with GGOs. Differential is broad and includes fungal, bacterial, and viral pathogens. CT chest is atypical for PCP but given CD4 count need to empirically treat with Bactrim and steroids given large Aa gradient. This may be a bacterial process as well; cover with broad spectrum antibiotics. Bronchoscopy with BAL in AM. Will follow.
AIDS with CD4 count 45 with respiratory symptoms. Left lower consolation with GGOs. On broad spectrum antibiotics. S/p bronchoscopy with BAL. Continue with empiric PCP treatment. Will follow.
AIDS. Empiric treatment for PCP. BAL shows PMN predominance with all cultures in progress and pending. Will follow.
AIDS. Empiric PCP treatment, cultures are not back yet. Empiric 7 day course of CAP antibiotics. Needs outpatient follow up for HIV/AIDS.
Dispo: bronch today
Dispo: bronch tomorrow
Dispo: anticipate d/c home tomorrow  Pt. to go to [facility] office after d/c
Dispo: d/c home today -- see d/c summary  Pt. to go directly to [facility] office
Dispo: pending clinical progress, BAL results

## 2018-12-14 NOTE — PROGRESS NOTE ADULT - PROBLEM SELECTOR PLAN 1
in setting of AIDS; Pulm following; cont. ceftriaxone + azithromycin (for CAP), and TMP-SMX (for possible PCP, + steroids, given large A-a gradient) -- can change all abx to PO; fungal serologies pending; weaned off O2; s/p bronch 12/11, f/u results

## 2018-12-14 NOTE — PROGRESS NOTE ADULT - SUBJECTIVE AND OBJECTIVE BOX
Patient is a 42y old  Male who presents with a chief complaint of CAP (13 Dec 2018 14:28)      INTERVAL HPI/OVERNIGHT EVENTS:    Pt. seen and examined at 9:30AM  No new complaints  Cough improving  Using I.S.    Review of Systems: 12 point review of systems otherwise negative    MEDICATIONS  (STANDING):  azithromycin   Tablet 250 milliGRAM(s) Oral every 24 hours  cefTRIAXone   IVPB 1 Gram(s) IV Intermittent every 24 hours  docusate sodium 100 milliGRAM(s) Oral three times a day  fluconAZOLE   Tablet 200 milliGRAM(s) Oral every 24 hours  lisinopril 5 milliGRAM(s) Oral daily  pantoprazole    Tablet 40 milliGRAM(s) Oral before breakfast  predniSONE   Tablet 40 milliGRAM(s) Oral every 12 hours  senna 2 Tablet(s) Oral at bedtime  trimethoprim  160 mG/sulfamethoxazole 800 mG 2 Tablet(s) Oral <User Schedule>    MEDICATIONS  (PRN):  acetaminophen   Tablet .. 650 milliGRAM(s) Oral every 6 hours PRN Moderate Pain (4 - 6)  acetaminophen   Tablet .. 650 milliGRAM(s) Oral every 6 hours PRN Temp greater or equal to 38C (100.4F)  ALBUTerol/ipratropium for Nebulization 3 milliLiter(s) Nebulizer every 6 hours PRN Shortness of Breath  benzonatate 100 milliGRAM(s) Oral every 8 hours PRN Cough      Allergies    No Known Allergies    Intolerances          Vital Signs Last 24 Hrs  T(C): 36.4 (14 Dec 2018 09:10), Max: 37.3 (13 Dec 2018 16:10)  T(F): 97.5 (14 Dec 2018 09:10), Max: 99.1 (13 Dec 2018 16:10)  HR: 84 (14 Dec 2018 09:10) (69 - 84)  BP: 145/98 (14 Dec 2018 09:10) (126/82 - 145/98)  BP(mean): --  RR: 18 (14 Dec 2018 09:10) (17 - 18)  SpO2: 98% (14 Dec 2018 09:10) (95% - 98%)  CAPILLARY BLOOD GLUCOSE          12-13 @ 07:01  -  12-14 @ 07:00  --------------------------------------------------------  IN: 0 mL / OUT: 975 mL / NET: -975 mL        Physical Exam:  (at 9:30AM)  Daily     Daily   General: well-appearing in NAD  HEENT:  MMM  CV: no JVD  Lungs:  normal WOB on RA  Neuro:  AAOx3    LABS:                        13.6   6.1   )-----------( 238      ( 14 Dec 2018 07:11 )             39.9     12-14    126<L>  |  91<L>  |  16  ----------------------------<  108<H>  4.4   |  20<L>  |  0.87    Ca    9.3      14 Dec 2018 07:11  Mg     2.4     12-14              RADIOLOGY & ADDITIONAL TESTS:    ---------------------------------------------------------------------------  I personally reviewed: [  ]EKG   [  ]CXR    [  ] CT    [  ]Other  ---------------------------------------------------------------------------  PLEASE CHECK WHEN PRESENT:     [  ]Heart Failure     [  ] Acute     [  ] Acute on Chronic     [  ] Chronic  -------------------------------------------------------------------     [  ]Diastolic [HFpEF]     [  ]Systolic [HFrEF]     [  ]Combined [HFpEF & HFrEF]     [  ]Other:  -------------------------------------------------------------------  [  ]SABRINA     [  ]ATN     [  ]Reneal Medullary Necrosis     [  ]Renal Cortical Necrosis     [  ]Other Pathological Lesions:    [  ]CKD 1  [  ]CKD 2  [  ]CKD 3  [  ]CKD 4  [  ]CKD 5  [  ]Other  -------------------------------------------------------------------  [  ]Other/Unspecified:    --------------------------------------------------------------------    Abdominal Nutritional Status  [  ]Malnutrition: See Nutrition Note  [  ]Cachexia  [  ]Other:   [  ]Supplement Ordered:  [  ]Morbid Obesity (BMI >=40]

## 2018-12-14 NOTE — PROGRESS NOTE ADULT - PROBLEM SELECTOR PROBLEM 1
CAP (community acquired pneumonia)
Pneumonia
Pneumonia of left lower lobe due to infectious organism
Pneumonia
CAP (community acquired pneumonia)

## 2018-12-15 LAB
C IMMITIS AB FLD QL CF: ABNORMAL
C IMMITIS IGM SPEC QL IA: NEGATIVE — SIGNIFICANT CHANGE UP
COCCIDIOIDES IGG SPEC QL IA: NEGATIVE — SIGNIFICANT CHANGE UP
H CAPSUL AG SER IA-MCNC: SIGNIFICANT CHANGE UP

## 2018-12-16 LAB
CULTURE RESULTS: SIGNIFICANT CHANGE UP
SPECIMEN SOURCE: SIGNIFICANT CHANGE UP

## 2018-12-17 DIAGNOSIS — J18.9 PNEUMONIA, UNSPECIFIED ORGANISM: ICD-10-CM

## 2018-12-17 DIAGNOSIS — J45.909 UNSPECIFIED ASTHMA, UNCOMPLICATED: ICD-10-CM

## 2018-12-17 DIAGNOSIS — I10 ESSENTIAL (PRIMARY) HYPERTENSION: ICD-10-CM

## 2018-12-17 DIAGNOSIS — K59.00 CONSTIPATION, UNSPECIFIED: ICD-10-CM

## 2018-12-17 DIAGNOSIS — Z91.14 PATIENT'S OTHER NONCOMPLIANCE WITH MEDICATION REGIMEN: ICD-10-CM

## 2018-12-17 DIAGNOSIS — B59 PNEUMOCYSTOSIS: ICD-10-CM

## 2018-12-17 DIAGNOSIS — E22.2 SYNDROME OF INAPPROPRIATE SECRETION OF ANTIDIURETIC HORMONE: ICD-10-CM

## 2018-12-17 DIAGNOSIS — B20 HUMAN IMMUNODEFICIENCY VIRUS [HIV] DISEASE: ICD-10-CM

## 2018-12-17 LAB
MISCELLANEOUS TEST NAME: SIGNIFICANT CHANGE UP

## 2018-12-18 RX ORDER — AZITHROMYCIN 500 MG/1
2 TABLET, FILM COATED ORAL
Qty: 9 | Refills: 0 | OUTPATIENT
Start: 2018-12-18 | End: 2019-01-16

## 2018-12-28 LAB — RESPIRATORY PATH PNL SPEC CULT: SIGNIFICANT CHANGE UP

## 2019-01-02 LAB — RESPIRATORY PATH PNL SPEC CULT: SIGNIFICANT CHANGE UP

## 2019-01-09 PROCEDURE — 93970 EXTREMITY STUDY: CPT

## 2019-01-09 PROCEDURE — 87040 BLOOD CULTURE FOR BACTERIA: CPT

## 2019-01-09 PROCEDURE — 87116 MYCOBACTERIA CULTURE: CPT

## 2019-01-09 PROCEDURE — 86900 BLOOD TYPING SEROLOGIC ABO: CPT

## 2019-01-09 PROCEDURE — 82728 ASSAY OF FERRITIN: CPT

## 2019-01-09 PROCEDURE — 87305 ASPERGILLUS AG IA: CPT

## 2019-01-09 PROCEDURE — 87449 NOS EACH ORGANISM AG IA: CPT

## 2019-01-09 PROCEDURE — 80053 COMPREHEN METABOLIC PANEL: CPT

## 2019-01-09 PROCEDURE — 86901 BLOOD TYPING SEROLOGIC RH(D): CPT

## 2019-01-09 PROCEDURE — 85730 THROMBOPLASTIN TIME PARTIAL: CPT

## 2019-01-09 PROCEDURE — 85027 COMPLETE CBC AUTOMATED: CPT

## 2019-01-09 PROCEDURE — 86850 RBC ANTIBODY SCREEN: CPT

## 2019-01-09 PROCEDURE — 87385 HISTOPLASMA CAPSUL AG IA: CPT

## 2019-01-09 PROCEDURE — 71250 CT THORAX DX C-: CPT

## 2019-01-09 PROCEDURE — 87015 SPECIMEN INFECT AGNT CONCNTJ: CPT

## 2019-01-09 PROCEDURE — 80076 HEPATIC FUNCTION PANEL: CPT

## 2019-01-09 PROCEDURE — 84443 ASSAY THYROID STIM HORMONE: CPT

## 2019-01-09 PROCEDURE — 87252 VIRUS INOCULATION TISSUE: CPT

## 2019-01-09 PROCEDURE — 86635 COCCIDIOIDES ANTIBODY: CPT

## 2019-01-09 PROCEDURE — 82570 ASSAY OF URINE CREATININE: CPT

## 2019-01-09 PROCEDURE — 86359 T CELLS TOTAL COUNT: CPT

## 2019-01-09 PROCEDURE — 87536 HIV-1 QUANT&REVRSE TRNSCRPJ: CPT

## 2019-01-09 PROCEDURE — 96374 THER/PROPH/DIAG INJ IV PUSH: CPT

## 2019-01-09 PROCEDURE — 84100 ASSAY OF PHOSPHORUS: CPT

## 2019-01-09 PROCEDURE — 83735 ASSAY OF MAGNESIUM: CPT

## 2019-01-09 PROCEDURE — 88112 CYTOPATH CELL ENHANCE TECH: CPT

## 2019-01-09 PROCEDURE — 86612 BLASTOMYCES ANTIBODY: CPT

## 2019-01-09 PROCEDURE — 87581 M.PNEUMON DNA AMP PROBE: CPT

## 2019-01-09 PROCEDURE — 86803 HEPATITIS C AB TEST: CPT

## 2019-01-09 PROCEDURE — 86480 TB TEST CELL IMMUN MEASURE: CPT

## 2019-01-09 PROCEDURE — 85025 COMPLETE CBC W/AUTO DIFF WBC: CPT

## 2019-01-09 PROCEDURE — 82803 BLOOD GASES ANY COMBINATION: CPT

## 2019-01-09 PROCEDURE — 87798 DETECT AGENT NOS DNA AMP: CPT

## 2019-01-09 PROCEDURE — 71046 X-RAY EXAM CHEST 2 VIEWS: CPT

## 2019-01-09 PROCEDURE — 87206 SMEAR FLUORESCENT/ACID STAI: CPT

## 2019-01-09 PROCEDURE — 83935 ASSAY OF URINE OSMOLALITY: CPT

## 2019-01-09 PROCEDURE — 87633 RESP VIRUS 12-25 TARGETS: CPT

## 2019-01-09 PROCEDURE — 84439 ASSAY OF FREE THYROXINE: CPT

## 2019-01-09 PROCEDURE — 84300 ASSAY OF URINE SODIUM: CPT

## 2019-01-09 PROCEDURE — 80048 BASIC METABOLIC PNL TOTAL CA: CPT

## 2019-01-09 PROCEDURE — 83036 HEMOGLOBIN GLYCOSYLATED A1C: CPT

## 2019-01-09 PROCEDURE — 88305 TISSUE EXAM BY PATHOLOGIST: CPT

## 2019-01-09 PROCEDURE — 89051 BODY FLUID CELL COUNT: CPT

## 2019-01-09 PROCEDURE — 83615 LACTATE (LD) (LDH) ENZYME: CPT

## 2019-01-09 PROCEDURE — 99285 EMERGENCY DEPT VISIT HI MDM: CPT | Mod: 25

## 2019-01-09 PROCEDURE — 87070 CULTURE OTHR SPECIMN AEROBIC: CPT

## 2019-01-09 PROCEDURE — 71045 X-RAY EXAM CHEST 1 VIEW: CPT

## 2019-01-09 PROCEDURE — 85610 PROTHROMBIN TIME: CPT

## 2019-01-09 PROCEDURE — 84484 ASSAY OF TROPONIN QUANT: CPT

## 2019-01-09 PROCEDURE — 83540 ASSAY OF IRON: CPT

## 2019-01-09 PROCEDURE — 83930 ASSAY OF BLOOD OSMOLALITY: CPT

## 2019-01-09 PROCEDURE — 80061 LIPID PANEL: CPT

## 2019-01-09 PROCEDURE — 88312 SPECIAL STAINS GROUP 1: CPT

## 2019-01-09 PROCEDURE — 36415 COLL VENOUS BLD VENIPUNCTURE: CPT

## 2019-01-09 PROCEDURE — 93005 ELECTROCARDIOGRAM TRACING: CPT

## 2019-01-09 PROCEDURE — 87102 FUNGUS ISOLATION CULTURE: CPT

## 2019-01-09 PROCEDURE — 81003 URINALYSIS AUTO W/O SCOPE: CPT

## 2019-01-09 PROCEDURE — 74019 RADEX ABDOMEN 2 VIEWS: CPT

## 2019-01-09 PROCEDURE — 87486 CHLMYD PNEUM DNA AMP PROBE: CPT

## 2019-01-09 PROCEDURE — 83550 IRON BINDING TEST: CPT

## 2019-01-09 PROCEDURE — 90686 IIV4 VACC NO PRSV 0.5 ML IM: CPT

## 2019-01-10 LAB
CULTURE RESULTS: SIGNIFICANT CHANGE UP
CULTURE RESULTS: SIGNIFICANT CHANGE UP
SPECIMEN SOURCE: SIGNIFICANT CHANGE UP
SPECIMEN SOURCE: SIGNIFICANT CHANGE UP

## 2023-02-08 NOTE — H&P ADULT - NSHPPHYSICALEXAM_GEN_ALL_CORE
Called Isaias to discuss, he was in agreement with plan. Will increase Toprol to 150 mg daily, monitor BP, watch for s/s of hypotension. Echo ordered, call transferred to .   .  VITAL SIGNS:  T(C): 36.9 (12-09-18 @ 09:11), Max: 36.9 (12-09-18 @ 08:36)  T(F): 98.4 (12-09-18 @ 09:11), Max: 98.5 (12-09-18 @ 08:36)  HR: 82 (12-09-18 @ 10:36) (80 - 99)  BP: 164/114 (12-09-18 @ 10:36) (148/111 - 164/114)  BP(mean): --  RR: 18 (12-09-18 @ 09:11) (16 - 18)  SpO2: 97% (12-09-18 @ 09:11) (95% - 99%)  Wt(kg): --    PHYSICAL EXAM:    Constitutional: WDWN resting comfortably in bed; NAD, no respiratory distress  Head: NC/AT  Eyes: PERRL, EOMI, anicteric sclera  ENT: no nasal discharge; uvula midline, no oropharyngeal erythema or exudates; somewhat dry  Neck: supple; no JVD or thyromegaly  Respiratory: scattered wheezing/rhonchi b/l, otherwise CTA b/l, no conversational dyspnea  Cardiac: +S1/S2; RRR; no M/R/G  Gastrointestinal: soft, obese abdomen, NT/ND; no rebound or guarding; +BSx4  Back: spine midline, no bony tenderness or step-offs; no CVAT B/L  Extremities: WWP, no clubbing or cyanosis; no peripheral edema  Musculoskeletal: NROM x4; no joint swelling, tenderness or erythema  Vascular: 2+ radial, femoral, DP/PT pulses B/L  Dermatologic: skin warm, dry and intact; no rashes, wounds, or scars  Lymphatic: no submandibular or cervical LAD  Neurologic: AAOx3; CNII-XII grossly intact; no focal deficits  Psychiatric: affect and characteristics of appearance, verbalizations, behaviors are appropriate

## 2023-02-27 NOTE — H&P ADULT - ATTENDING COMMENTS
Follow lactation education   Pt seen and examined by me at bedside earlier in AM. Agree with housestaff's exam/a/p as noted above with additions,    used- hx of HIV, unknown CD4 count, c/o 1 month hx of cough with occasional productive, denier fever/chills/chest pain. denies weight. reports exertional sob x1 month. Run out of meds for approx 1month. Usually follows-up at [facility].   VSS, on NC, +slight decrease bs on L base, with persistent dry cough throughout the exam.   labs reviewed   EKG reviewed    a/p:  1. CAP/?PCP PNA- given the duration- pulm consult to r/o PCP pna  2. HIV-check CD4 count, VL, HIV consult on monday  3. Hyponatremia-trend for now.   rest of a/p as above

## 2024-11-05 NOTE — PROGRESS NOTE ADULT - PROBLEM SELECTOR PLAN 8
05-Nov-2024 02:40 1) PCP Contacted on Admission: (Y/N) --> Name & Phone #:  2) Date of Contact with PCP:  3) PCP Contacted at Discharge: (Y/N, N/A)  4) Summary of Handoff Given to PCP:   5) Post-Discharge Appointment Date and Location: 1) PCP Contacted on Admission: (Y/N) --> Name & Phone #: Poncho Yu  2) Date of Contact with PCP: Called multiple times 12/10, unable to make contact  3) PCP Contacted at Discharge: (Y/N, N/A)  4) Summary of Handoff Given to PCP:   5) Post-Discharge Appointment Date and Location: